# Patient Record
Sex: FEMALE | Race: BLACK OR AFRICAN AMERICAN | NOT HISPANIC OR LATINO | URBAN - METROPOLITAN AREA
[De-identification: names, ages, dates, MRNs, and addresses within clinical notes are randomized per-mention and may not be internally consistent; named-entity substitution may affect disease eponyms.]

---

## 2021-06-11 ENCOUNTER — INPATIENT (INPATIENT)
Facility: HOSPITAL | Age: 56
LOS: 5 days | Discharge: HOME | End: 2021-06-17
Attending: STUDENT IN AN ORGANIZED HEALTH CARE EDUCATION/TRAINING PROGRAM | Admitting: STUDENT IN AN ORGANIZED HEALTH CARE EDUCATION/TRAINING PROGRAM
Payer: COMMERCIAL

## 2021-06-11 VITALS
DIASTOLIC BLOOD PRESSURE: 91 MMHG | OXYGEN SATURATION: 100 % | HEART RATE: 92 BPM | TEMPERATURE: 98 F | SYSTOLIC BLOOD PRESSURE: 176 MMHG | RESPIRATION RATE: 18 BRPM

## 2021-06-11 DIAGNOSIS — Z98.890 OTHER SPECIFIED POSTPROCEDURAL STATES: Chronic | ICD-10-CM

## 2021-06-11 LAB
ALBUMIN SERPL ELPH-MCNC: 3.7 G/DL — SIGNIFICANT CHANGE UP (ref 3.3–5)
ALP SERPL-CCNC: 119 U/L — SIGNIFICANT CHANGE UP (ref 40–120)
ALT FLD-CCNC: 10 U/L — SIGNIFICANT CHANGE UP (ref 4–33)
ANION GAP SERPL CALC-SCNC: 13 MMOL/L — SIGNIFICANT CHANGE UP (ref 7–14)
AST SERPL-CCNC: 13 U/L — SIGNIFICANT CHANGE UP (ref 4–32)
BASOPHILS # BLD AUTO: 0.04 K/UL — SIGNIFICANT CHANGE UP (ref 0–0.2)
BASOPHILS NFR BLD AUTO: 0.5 % — SIGNIFICANT CHANGE UP (ref 0–2)
BILIRUB SERPL-MCNC: <0.2 MG/DL — SIGNIFICANT CHANGE UP (ref 0.2–1.2)
BUN SERPL-MCNC: 12 MG/DL — SIGNIFICANT CHANGE UP (ref 7–23)
CALCIUM SERPL-MCNC: 9.5 MG/DL — SIGNIFICANT CHANGE UP (ref 8.4–10.5)
CHLORIDE SERPL-SCNC: 102 MMOL/L — SIGNIFICANT CHANGE UP (ref 98–107)
CO2 SERPL-SCNC: 22 MMOL/L — SIGNIFICANT CHANGE UP (ref 22–31)
CREAT SERPL-MCNC: 1.17 MG/DL — SIGNIFICANT CHANGE UP (ref 0.5–1.3)
EOSINOPHIL # BLD AUTO: 0.13 K/UL — SIGNIFICANT CHANGE UP (ref 0–0.5)
EOSINOPHIL NFR BLD AUTO: 1.6 % — SIGNIFICANT CHANGE UP (ref 0–6)
GLUCOSE SERPL-MCNC: 220 MG/DL — HIGH (ref 70–99)
HCT VFR BLD CALC: 35.6 % — SIGNIFICANT CHANGE UP (ref 34.5–45)
HGB BLD-MCNC: 10.8 G/DL — LOW (ref 11.5–15.5)
IANC: 4.93 K/UL — SIGNIFICANT CHANGE UP (ref 1.5–8.5)
IMM GRANULOCYTES NFR BLD AUTO: 0.8 % — SIGNIFICANT CHANGE UP (ref 0–1.5)
LYMPHOCYTES # BLD AUTO: 2.37 K/UL — SIGNIFICANT CHANGE UP (ref 1–3.3)
LYMPHOCYTES # BLD AUTO: 29.7 % — SIGNIFICANT CHANGE UP (ref 13–44)
MCHC RBC-ENTMCNC: 24.8 PG — LOW (ref 27–34)
MCHC RBC-ENTMCNC: 30.3 GM/DL — LOW (ref 32–36)
MCV RBC AUTO: 81.8 FL — SIGNIFICANT CHANGE UP (ref 80–100)
MONOCYTES # BLD AUTO: 0.45 K/UL — SIGNIFICANT CHANGE UP (ref 0–0.9)
MONOCYTES NFR BLD AUTO: 5.6 % — SIGNIFICANT CHANGE UP (ref 2–14)
NEUTROPHILS # BLD AUTO: 4.93 K/UL — SIGNIFICANT CHANGE UP (ref 1.8–7.4)
NEUTROPHILS NFR BLD AUTO: 61.8 % — SIGNIFICANT CHANGE UP (ref 43–77)
NRBC # BLD: 0 /100 WBCS — SIGNIFICANT CHANGE UP
NRBC # FLD: 0 K/UL — SIGNIFICANT CHANGE UP
NT-PROBNP SERPL-SCNC: 166 PG/ML — SIGNIFICANT CHANGE UP
PLATELET # BLD AUTO: 204 K/UL — SIGNIFICANT CHANGE UP (ref 150–400)
POTASSIUM SERPL-MCNC: 4 MMOL/L — SIGNIFICANT CHANGE UP (ref 3.5–5.3)
POTASSIUM SERPL-SCNC: 4 MMOL/L — SIGNIFICANT CHANGE UP (ref 3.5–5.3)
PROT SERPL-MCNC: 7.5 G/DL — SIGNIFICANT CHANGE UP (ref 6–8.3)
RBC # BLD: 4.35 M/UL — SIGNIFICANT CHANGE UP (ref 3.8–5.2)
RBC # FLD: 16.3 % — HIGH (ref 10.3–14.5)
SODIUM SERPL-SCNC: 137 MMOL/L — SIGNIFICANT CHANGE UP (ref 135–145)
TROPONIN T, HIGH SENSITIVITY RESULT: 7 NG/L — SIGNIFICANT CHANGE UP
WBC # BLD: 7.98 K/UL — SIGNIFICANT CHANGE UP (ref 3.8–10.5)
WBC # FLD AUTO: 7.98 K/UL — SIGNIFICANT CHANGE UP (ref 3.8–10.5)

## 2021-06-11 PROCEDURE — 99285 EMERGENCY DEPT VISIT HI MDM: CPT | Mod: 25

## 2021-06-11 PROCEDURE — 71045 X-RAY EXAM CHEST 1 VIEW: CPT | Mod: 26

## 2021-06-11 PROCEDURE — 93970 EXTREMITY STUDY: CPT | Mod: 26

## 2021-06-11 PROCEDURE — 76770 US EXAM ABDO BACK WALL COMP: CPT | Mod: 26

## 2021-06-11 PROCEDURE — 93010 ELECTROCARDIOGRAM REPORT: CPT | Mod: NC

## 2021-06-11 RX ORDER — DIPHENHYDRAMINE HCL 50 MG
25 CAPSULE ORAL EVERY 4 HOURS
Refills: 0 | Status: DISCONTINUED | OUTPATIENT
Start: 2021-06-11 | End: 2021-06-17

## 2021-06-11 RX ORDER — ASPIRIN/CALCIUM CARB/MAGNESIUM 324 MG
162 TABLET ORAL ONCE
Refills: 0 | Status: COMPLETED | OUTPATIENT
Start: 2021-06-11 | End: 2021-06-11

## 2021-06-11 RX ADMIN — Medication 25 MILLIGRAM(S): at 21:39

## 2021-06-11 RX ADMIN — Medication 30 MILLILITER(S): at 21:39

## 2021-06-11 RX ADMIN — Medication 162 MILLIGRAM(S): at 21:39

## 2021-06-11 NOTE — ED ADULT TRIAGE NOTE - CHIEF COMPLAINT QUOTE
Complaining of bilateral LE edema x 3 weeks. Denies shortness of breath. Complaining of diffuse chest pain, neck pain and bilateral shoulder pain. History of type 2 diabetes, htn.

## 2021-06-11 NOTE — ED ADULT NURSE NOTE - OBJECTIVE STATEMENT
Floarsenio RN-patient received to room 2 A&Ox3 ambulatory with walker. C/O worsening B/L LLE increase in Urinary frequency and decrease in output X1 month. also C/O increase in pain in her Chronic back pain and shoulder pain. NSR on cardiac monitor. PMh DM and HTN. denies SOB nausea vomiting hematuria dysuria. 20G IV placed in RAC. labs sent. Pt endorsed to primary RN Cem. will continue to monitor.

## 2021-06-11 NOTE — ED PROVIDER NOTE - OBJECTIVE STATEMENT
57 y/o F with PMH HTN, HLD, DM on insulin, chronic back pain, fibromyalgia p/w B/L LE swelling and pain along with chest pain. Pt accompanied by her sister states she has been living in [place]. Pt states she started having worsening swelling in her b/l LE . She states that the swelling was accompanied by decreased urination as well. She states she has also had pain in her shoulder and chest which she has been taking pain medications for in [place], percocet. She is currently on duloxetine, gabapentin , carvediliol, losartan, amlodipine. She does not have a doctor here. She also complaining of itching in her b/l legs

## 2021-06-11 NOTE — ED PROVIDER NOTE - NS ED MD DISPO SPECIAL CONSIDERATION1
1.  No surgery indicated.  2.  Recommend see neurology and possible second opinion arm/hand specialist.       Fall Precaution/Geriatrics/Frailty/Low Suspicion of COVID-19

## 2021-06-11 NOTE — ED PROVIDER NOTE - PHYSICAL EXAMINATION
Gen: Awake, Alert, WD, WN, NAD  Head:  NC/AT  Eyes:  PERRL, EOMI, Conjunctiva pink, lids normal, no scleral icterus  ENT: OP clear, no exudates, , moist mucus membranes  Neck: supple, nontender, no meningismus, no JVD, trachea midline  Cardiac/CV:  S1 S2, RRR, no M/G/R  Respiratory/Pulm:  CTAB, good air movement, normal resp effort, no wheezes/stridor/retractions/rales/rhonchi  Gastrointestinal/Abdomen:  Soft, nontender, nondistended, +BS, no rebound/guarding  Back:  no CVAT, no MLT  Ext:  warm, well perfused, moving all extremities spontaneously, 4+  peripheral edema, distal pulses intact  Skin: intact, no rash  Neuro:  AAOx3, sensation intact, motor 5/5 x 4 extremities, speech clear

## 2021-06-11 NOTE — ED PROVIDER NOTE - NS ED ROS FT
denies fever, chills, +chest pain, SOB, abdominal pain, diarrhea, +decreased urination  syncope, bleeding, new rash,weakness, numbness, blurred vision  + LE swelling   ROS  otherwise negative as per HPI

## 2021-06-11 NOTE — ED PROVIDER NOTE - CLINICAL SUMMARY MEDICAL DECISION MAKING FREE TEXT BOX
55 y/o F with PMH HTN, HLD, DM on insulin, chronic back pain, fibromyalgia p/w B/L LE swelling and pain along with chest pain. Pt accompanied by her sister states she has been living in [place]. Pt states she started having worsening swelling in her b/l LE . She states that the swelling was accompanied by decreased urination as well. She states she has also had pain in her shoulder and chest which she has been taking pain medications for in [place],   pt w/ LE edema concerning for CHF vs renal failure, pt w/ decreased urinary output, will check ua, cbc, cmp, us kidney bladder, eval for dvt duplex b/l le, trop, heart score of 5. aspirin, will likely need lasix admission for diuresis and evaluation for possible CHF vs RD

## 2021-06-11 NOTE — ED PROVIDER NOTE - ATTENDING CONTRIBUTION TO CARE
attending wrote note  Dr. Rivas: I have personally seen and examined this patient at the bedside. I have fully participated in the care of this patient. I have reviewed all pertinent clinical information, including history, physical exam, plan and the Resident's note and agree except as noted. HPI above as by me. PE above as by me. DDX PLAN

## 2021-06-12 DIAGNOSIS — Z98.890 OTHER SPECIFIED POSTPROCEDURAL STATES: Chronic | ICD-10-CM

## 2021-06-12 DIAGNOSIS — E11.42 TYPE 2 DIABETES MELLITUS WITH DIABETIC POLYNEUROPATHY: ICD-10-CM

## 2021-06-12 DIAGNOSIS — E78.5 HYPERLIPIDEMIA, UNSPECIFIED: ICD-10-CM

## 2021-06-12 DIAGNOSIS — M79.7 FIBROMYALGIA: ICD-10-CM

## 2021-06-12 DIAGNOSIS — Z29.9 ENCOUNTER FOR PROPHYLACTIC MEASURES, UNSPECIFIED: ICD-10-CM

## 2021-06-12 DIAGNOSIS — I10 ESSENTIAL (PRIMARY) HYPERTENSION: ICD-10-CM

## 2021-06-12 DIAGNOSIS — R60.9 EDEMA, UNSPECIFIED: ICD-10-CM

## 2021-06-12 DIAGNOSIS — R07.9 CHEST PAIN, UNSPECIFIED: ICD-10-CM

## 2021-06-12 LAB
ANION GAP SERPL CALC-SCNC: 13 MMOL/L — SIGNIFICANT CHANGE UP (ref 7–14)
APPEARANCE UR: CLEAR — SIGNIFICANT CHANGE UP
BACTERIA # UR AUTO: NEGATIVE — SIGNIFICANT CHANGE UP
BASOPHILS # BLD AUTO: 0.03 K/UL — SIGNIFICANT CHANGE UP (ref 0–0.2)
BASOPHILS NFR BLD AUTO: 0.5 % — SIGNIFICANT CHANGE UP (ref 0–2)
BILIRUB UR-MCNC: NEGATIVE — SIGNIFICANT CHANGE UP
BUN SERPL-MCNC: 9 MG/DL — SIGNIFICANT CHANGE UP (ref 7–23)
CALCIUM SERPL-MCNC: 9.1 MG/DL — SIGNIFICANT CHANGE UP (ref 8.4–10.5)
CHLORIDE SERPL-SCNC: 103 MMOL/L — SIGNIFICANT CHANGE UP (ref 98–107)
CO2 SERPL-SCNC: 21 MMOL/L — LOW (ref 22–31)
COLOR SPEC: SIGNIFICANT CHANGE UP
CREAT ?TM UR-MCNC: 51 MG/DL — SIGNIFICANT CHANGE UP
CREAT SERPL-MCNC: 1.06 MG/DL — SIGNIFICANT CHANGE UP (ref 0.5–1.3)
DIFF PNL FLD: NEGATIVE — SIGNIFICANT CHANGE UP
EOSINOPHIL # BLD AUTO: 0.12 K/UL — SIGNIFICANT CHANGE UP (ref 0–0.5)
EOSINOPHIL NFR BLD AUTO: 1.9 % — SIGNIFICANT CHANGE UP (ref 0–6)
EPI CELLS # UR: 1 /HPF — SIGNIFICANT CHANGE UP (ref 0–5)
GLUCOSE BLDC GLUCOMTR-MCNC: 242 MG/DL — HIGH (ref 70–99)
GLUCOSE BLDC GLUCOMTR-MCNC: 273 MG/DL — HIGH (ref 70–99)
GLUCOSE BLDC GLUCOMTR-MCNC: 292 MG/DL — HIGH (ref 70–99)
GLUCOSE BLDC GLUCOMTR-MCNC: 321 MG/DL — HIGH (ref 70–99)
GLUCOSE SERPL-MCNC: 230 MG/DL — HIGH (ref 70–99)
GLUCOSE UR QL: ABNORMAL
HCT VFR BLD CALC: 30.3 % — LOW (ref 34.5–45)
HGB BLD-MCNC: 9.6 G/DL — LOW (ref 11.5–15.5)
IANC: 4.04 K/UL — SIGNIFICANT CHANGE UP (ref 1.5–8.5)
IMM GRANULOCYTES NFR BLD AUTO: 0.3 % — SIGNIFICANT CHANGE UP (ref 0–1.5)
KETONES UR-MCNC: NEGATIVE — SIGNIFICANT CHANGE UP
LEUKOCYTE ESTERASE UR-ACNC: NEGATIVE — SIGNIFICANT CHANGE UP
LYMPHOCYTES # BLD AUTO: 1.74 K/UL — SIGNIFICANT CHANGE UP (ref 1–3.3)
LYMPHOCYTES # BLD AUTO: 27.2 % — SIGNIFICANT CHANGE UP (ref 13–44)
MAGNESIUM SERPL-MCNC: 1.7 MG/DL — SIGNIFICANT CHANGE UP (ref 1.6–2.6)
MCHC RBC-ENTMCNC: 25.3 PG — LOW (ref 27–34)
MCHC RBC-ENTMCNC: 31.7 GM/DL — LOW (ref 32–36)
MCV RBC AUTO: 79.7 FL — LOW (ref 80–100)
MONOCYTES # BLD AUTO: 0.44 K/UL — SIGNIFICANT CHANGE UP (ref 0–0.9)
MONOCYTES NFR BLD AUTO: 6.9 % — SIGNIFICANT CHANGE UP (ref 2–14)
NEUTROPHILS # BLD AUTO: 4.04 K/UL — SIGNIFICANT CHANGE UP (ref 1.8–7.4)
NEUTROPHILS NFR BLD AUTO: 63.2 % — SIGNIFICANT CHANGE UP (ref 43–77)
NITRITE UR-MCNC: NEGATIVE — SIGNIFICANT CHANGE UP
NRBC # BLD: 0 /100 WBCS — SIGNIFICANT CHANGE UP
NRBC # FLD: 0 K/UL — SIGNIFICANT CHANGE UP
PH UR: 7.5 — SIGNIFICANT CHANGE UP (ref 5–8)
PHOSPHATE SERPL-MCNC: 3.3 MG/DL — SIGNIFICANT CHANGE UP (ref 2.5–4.5)
PLATELET # BLD AUTO: 180 K/UL — SIGNIFICANT CHANGE UP (ref 150–400)
POTASSIUM SERPL-MCNC: 3.8 MMOL/L — SIGNIFICANT CHANGE UP (ref 3.5–5.3)
POTASSIUM SERPL-SCNC: 3.8 MMOL/L — SIGNIFICANT CHANGE UP (ref 3.5–5.3)
PROT UR-MCNC: ABNORMAL
RBC # BLD: 3.8 M/UL — SIGNIFICANT CHANGE UP (ref 3.8–5.2)
RBC # FLD: 16.3 % — HIGH (ref 10.3–14.5)
RBC CASTS # UR COMP ASSIST: 1 /HPF — SIGNIFICANT CHANGE UP (ref 0–4)
SARS-COV-2 RNA SPEC QL NAA+PROBE: SIGNIFICANT CHANGE UP
SODIUM SERPL-SCNC: 137 MMOL/L — SIGNIFICANT CHANGE UP (ref 135–145)
SP GR SPEC: 1.01 — SIGNIFICANT CHANGE UP (ref 1.01–1.02)
TROPONIN T, HIGH SENSITIVITY RESULT: 6 NG/L — SIGNIFICANT CHANGE UP
UROBILINOGEN FLD QL: SIGNIFICANT CHANGE UP
WBC # BLD: 6.39 K/UL — SIGNIFICANT CHANGE UP (ref 3.8–10.5)
WBC # FLD AUTO: 6.39 K/UL — SIGNIFICANT CHANGE UP (ref 3.8–10.5)
WBC UR QL: 2 /HPF — SIGNIFICANT CHANGE UP (ref 0–5)

## 2021-06-12 PROCEDURE — 12345: CPT | Mod: NC

## 2021-06-12 PROCEDURE — 99223 1ST HOSP IP/OBS HIGH 75: CPT

## 2021-06-12 RX ORDER — GABAPENTIN 400 MG/1
300 CAPSULE ORAL THREE TIMES A DAY
Refills: 0 | Status: DISCONTINUED | OUTPATIENT
Start: 2021-06-12 | End: 2021-06-13

## 2021-06-12 RX ORDER — HUMAN INSULIN 100 [IU]/ML
8 INJECTION, SUSPENSION SUBCUTANEOUS ONCE
Refills: 0 | Status: COMPLETED | OUTPATIENT
Start: 2021-06-13 | End: 2021-06-13

## 2021-06-12 RX ORDER — DEXTROSE 50 % IN WATER 50 %
12.5 SYRINGE (ML) INTRAVENOUS ONCE
Refills: 0 | Status: DISCONTINUED | OUTPATIENT
Start: 2021-06-12 | End: 2021-06-17

## 2021-06-12 RX ORDER — FUROSEMIDE 40 MG
40 TABLET ORAL
Refills: 0 | Status: DISCONTINUED | OUTPATIENT
Start: 2021-06-12 | End: 2021-06-14

## 2021-06-12 RX ORDER — SUCRALFATE 1 G
1 TABLET ORAL
Refills: 0 | Status: DISCONTINUED | OUTPATIENT
Start: 2021-06-12 | End: 2021-06-17

## 2021-06-12 RX ORDER — HYDRALAZINE HCL 50 MG
2.5 TABLET ORAL ONCE
Refills: 0 | Status: COMPLETED | OUTPATIENT
Start: 2021-06-12 | End: 2021-06-12

## 2021-06-12 RX ORDER — INSULIN LISPRO 100/ML
VIAL (ML) SUBCUTANEOUS AT BEDTIME
Refills: 0 | Status: DISCONTINUED | OUTPATIENT
Start: 2021-06-12 | End: 2021-06-17

## 2021-06-12 RX ORDER — CARVEDILOL PHOSPHATE 80 MG/1
25 CAPSULE, EXTENDED RELEASE ORAL EVERY 12 HOURS
Refills: 0 | Status: DISCONTINUED | OUTPATIENT
Start: 2021-06-12 | End: 2021-06-17

## 2021-06-12 RX ORDER — ACETAMINOPHEN 500 MG
650 TABLET ORAL ONCE
Refills: 0 | Status: COMPLETED | OUTPATIENT
Start: 2021-06-12 | End: 2021-06-12

## 2021-06-12 RX ORDER — SODIUM CHLORIDE 9 MG/ML
1000 INJECTION, SOLUTION INTRAVENOUS
Refills: 0 | Status: DISCONTINUED | OUTPATIENT
Start: 2021-06-12 | End: 2021-06-17

## 2021-06-12 RX ORDER — DEXTROSE 50 % IN WATER 50 %
15 SYRINGE (ML) INTRAVENOUS ONCE
Refills: 0 | Status: DISCONTINUED | OUTPATIENT
Start: 2021-06-12 | End: 2021-06-17

## 2021-06-12 RX ORDER — DULOXETINE HYDROCHLORIDE 30 MG/1
30 CAPSULE, DELAYED RELEASE ORAL DAILY
Refills: 0 | Status: DISCONTINUED | OUTPATIENT
Start: 2021-06-12 | End: 2021-06-14

## 2021-06-12 RX ORDER — GLUCAGON INJECTION, SOLUTION 0.5 MG/.1ML
1 INJECTION, SOLUTION SUBCUTANEOUS ONCE
Refills: 0 | Status: DISCONTINUED | OUTPATIENT
Start: 2021-06-12 | End: 2021-06-17

## 2021-06-12 RX ORDER — AMLODIPINE BESYLATE 2.5 MG/1
10 TABLET ORAL DAILY
Refills: 0 | Status: DISCONTINUED | OUTPATIENT
Start: 2021-06-12 | End: 2021-06-12

## 2021-06-12 RX ORDER — DEXTROSE 50 % IN WATER 50 %
25 SYRINGE (ML) INTRAVENOUS ONCE
Refills: 0 | Status: DISCONTINUED | OUTPATIENT
Start: 2021-06-12 | End: 2021-06-17

## 2021-06-12 RX ORDER — LOSARTAN POTASSIUM 100 MG/1
100 TABLET, FILM COATED ORAL DAILY
Refills: 0 | Status: DISCONTINUED | OUTPATIENT
Start: 2021-06-12 | End: 2021-06-17

## 2021-06-12 RX ORDER — INSULIN LISPRO 100/ML
VIAL (ML) SUBCUTANEOUS
Refills: 0 | Status: DISCONTINUED | OUTPATIENT
Start: 2021-06-12 | End: 2021-06-17

## 2021-06-12 RX ORDER — BACLOFEN 100 %
10 POWDER (GRAM) MISCELLANEOUS THREE TIMES A DAY
Refills: 0 | Status: DISCONTINUED | OUTPATIENT
Start: 2021-06-12 | End: 2021-06-17

## 2021-06-12 RX ORDER — PANTOPRAZOLE SODIUM 20 MG/1
40 TABLET, DELAYED RELEASE ORAL
Refills: 0 | Status: DISCONTINUED | OUTPATIENT
Start: 2021-06-12 | End: 2021-06-17

## 2021-06-12 RX ORDER — NYSTATIN 500MM UNIT
500000 POWDER (EA) MISCELLANEOUS
Refills: 0 | Status: DISCONTINUED | OUTPATIENT
Start: 2021-06-12 | End: 2021-06-17

## 2021-06-12 RX ORDER — INSULIN GLARGINE 100 [IU]/ML
15 INJECTION, SOLUTION SUBCUTANEOUS EVERY MORNING
Refills: 0 | Status: DISCONTINUED | OUTPATIENT
Start: 2021-06-12 | End: 2021-06-12

## 2021-06-12 RX ADMIN — GABAPENTIN 300 MILLIGRAM(S): 400 CAPSULE ORAL at 13:29

## 2021-06-12 RX ADMIN — Medication 40 MILLIGRAM(S): at 17:59

## 2021-06-12 RX ADMIN — DULOXETINE HYDROCHLORIDE 30 MILLIGRAM(S): 30 CAPSULE, DELAYED RELEASE ORAL at 13:28

## 2021-06-12 RX ADMIN — Medication 500000 UNIT(S): at 22:12

## 2021-06-12 RX ADMIN — Medication 4: at 09:39

## 2021-06-12 RX ADMIN — PANTOPRAZOLE SODIUM 40 MILLIGRAM(S): 20 TABLET, DELAYED RELEASE ORAL at 06:53

## 2021-06-12 RX ADMIN — Medication 1 GRAM(S): at 13:29

## 2021-06-12 RX ADMIN — Medication 10 MILLIGRAM(S): at 22:10

## 2021-06-12 RX ADMIN — Medication 2.5 MILLIGRAM(S): at 02:54

## 2021-06-12 RX ADMIN — Medication 1: at 23:12

## 2021-06-12 RX ADMIN — Medication 10 MILLIGRAM(S): at 13:29

## 2021-06-12 RX ADMIN — CARVEDILOL PHOSPHATE 25 MILLIGRAM(S): 80 CAPSULE, EXTENDED RELEASE ORAL at 17:59

## 2021-06-12 RX ADMIN — Medication 1 GRAM(S): at 22:10

## 2021-06-12 RX ADMIN — Medication 650 MILLIGRAM(S): at 22:35

## 2021-06-12 RX ADMIN — INSULIN GLARGINE 15 UNIT(S): 100 INJECTION, SOLUTION SUBCUTANEOUS at 10:30

## 2021-06-12 RX ADMIN — Medication 6: at 17:58

## 2021-06-12 RX ADMIN — AMLODIPINE BESYLATE 10 MILLIGRAM(S): 2.5 TABLET ORAL at 06:53

## 2021-06-12 RX ADMIN — Medication 8: at 13:28

## 2021-06-12 RX ADMIN — Medication 500000 UNIT(S): at 18:34

## 2021-06-12 RX ADMIN — Medication 1 GRAM(S): at 17:59

## 2021-06-12 RX ADMIN — Medication 25 MILLIGRAM(S): at 20:55

## 2021-06-12 RX ADMIN — Medication 1 TABLET(S): at 13:28

## 2021-06-12 RX ADMIN — GABAPENTIN 300 MILLIGRAM(S): 400 CAPSULE ORAL at 22:10

## 2021-06-12 RX ADMIN — LOSARTAN POTASSIUM 100 MILLIGRAM(S): 100 TABLET, FILM COATED ORAL at 06:53

## 2021-06-12 RX ADMIN — Medication 650 MILLIGRAM(S): at 23:20

## 2021-06-12 RX ADMIN — Medication 500000 UNIT(S): at 13:27

## 2021-06-12 NOTE — H&P ADULT - ASSESSMENT
56 y.o. woman with HTN, HLD, DM II on insulin, diabetic neuropathy, fibromyalgia, and chronic back pain now with b/l LE edema.

## 2021-06-12 NOTE — H&P ADULT - HISTORY OF PRESENT ILLNESS
56 y.o. woman with history of HTN, HLD, DM II on insulin with peripheral neuropathy, and fibromyalgia who came to the ER for evaluation of b/l LE edema and paresthesia of 6 weeks in duration. Patient recently moved from [place]. She states that her symptoms were getting progressively, therefore, she came to the ER for further evaluation. No reports of chest pain, palpitations, or SOB. She reports intermittent compliance with her prescribed dose of metformin and basaglar. She also reports ongoing chronic lower back pain.

## 2021-06-12 NOTE — PROGRESS NOTE ADULT - PROBLEM SELECTOR PLAN 5
- Carbohydrate restricted diet  - Start on basaglar 15 units Daily as patient was not routinely taking metformin or the 20 units dose of basaglar at home  - will transition to 15units qHS tomorrow. and give NPH 8U tomorrow AM.   - HBA1c in AM  - Continue with gabapentin for treatment of the neuropathy  - her LE symptoms likely neuropathic symptoms. can increase gabapentin for symptom control.

## 2021-06-12 NOTE — H&P ADULT - PROBLEM SELECTOR PLAN 5
- Carbohydrate restricted diet  - Start on basaglar 15 units Daily as patient was not routinely taking metformin or the 20 units dose of basaglar at home  - HBA1c in AM  - Continue with gabapentin for treatment of the neuropathy

## 2021-06-12 NOTE — PATIENT PROFILE ADULT - NSPROGENSOURCEINFO_GEN_A_NUR
BLADDER INFECTION, Female (Adult)    A bladder infection (\"cystitis\" or \"UTI\") usually causes a constant urge to urinate and a burning when passing urine. Urine may be cloudy, smelly or dark. There may be pain in the lower abdomen. A bladder infection occurs when bacteria from the vaginal area enter the bladder opening (urethra). This can occur from sexual intercourse, wearing tight clothing, dehydration and other factors.  HOME CARE:  · Drink lots of fluids (at least 6-8 glasses a day, unless you must restrict fluids for other medical reasons). This will force the medicine into your urinary system and flush the bacteria out of your body.  · Avoid sexual intercourse until your symptoms are gone.  · Avoid caffeine, alcohol and spicy foods. These can irritate the bladder.  · A bladder infection is treated with antibiotics. You may also be given Pyridium (generic = phenazopyridine) to reduce the burning sensation. This medicine will cause your urine to become a bright orange color. The orange urine may stain clothing. You may wear a pad or panty-liner to protect clothing.  PREVENTING FUTURE INFECTIONS:  · Always wipe from front to back after a bowel movement.  · Keep the genital area clean and dry.  · Drink plenty of fluids each day to avoid dehydration.  · Both sexual partners should wash before intercourse.  · Urinate right after intercourse to flush out the bladder.  · Wear cotton underwear and cotton-lined panty hose; avoid tight-fitting pants.  · If you are on birth control pills and are having frequent bladder infections, discuss with your doctor.  FOLLOW UP: Return to this facility or see your doctor if ALL symptoms are not gone after three days of treatment.  GET PROMPT MEDICAL ATTENTION if any of the following occur:  · Fever of 100.4ºF (38ºC) or higher, or as directed by your healthcare provider  · No improvement by the third day of treatment  · Increasing back or abdominal pain  · Repeated vomiting; unable  to keep medicine down  · Weakness, dizziness or fainting  · Vaginal discharge  · Pain, redness or swelling in the labia (outer vaginal area)  © 4237-4551 [employer], [address] [place] [address], [place] [address]. All rights reserved. This information is not intended as a substitute for professional medical care. Always follow your healthcare professional's instructions.     patient

## 2021-06-12 NOTE — H&P ADULT - NSICDXPASTMEDICALHX_GEN_ALL_CORE_FT
PAST MEDICAL HISTORY:  DM (diabetes mellitus)     Fibromyalgia     HLD (hyperlipidemia)     HTN (hypertension)

## 2021-06-12 NOTE — H&P ADULT - NSHPPHYSICALEXAM_GEN_ALL_CORE
Vital Signs Last 24 Hrs  T(C): 36.8 (12 Jun 2021 02:42), Max: 37.2 (12 Jun 2021 01:34)  T(F): 98.2 (12 Jun 2021 02:42), Max: 98.9 (12 Jun 2021 01:34)  HR: 90 (12 Jun 2021 03:52) (70 - 92)  BP: 159/96 (12 Jun 2021 03:52) (159/96 - 189/97)  BP(mean): --  RR: 18 (12 Jun 2021 02:42) (16 - 18)  SpO2: 100% (12 Jun 2021 02:42) (100% - 100%)

## 2021-06-13 LAB
A1C WITH ESTIMATED AVERAGE GLUCOSE RESULT: 8.4 % — HIGH (ref 4–5.6)
ANION GAP SERPL CALC-SCNC: 14 MMOL/L — SIGNIFICANT CHANGE UP (ref 7–14)
BUN SERPL-MCNC: 6 MG/DL — LOW (ref 7–23)
CALCIUM SERPL-MCNC: 9.6 MG/DL — SIGNIFICANT CHANGE UP (ref 8.4–10.5)
CHLORIDE SERPL-SCNC: 103 MMOL/L — SIGNIFICANT CHANGE UP (ref 98–107)
CO2 SERPL-SCNC: 24 MMOL/L — SIGNIFICANT CHANGE UP (ref 22–31)
COVID-19 SPIKE DOMAIN AB INTERP: POSITIVE
COVID-19 SPIKE DOMAIN ANTIBODY RESULT: >250 U/ML — HIGH
CREAT SERPL-MCNC: 1.07 MG/DL — SIGNIFICANT CHANGE UP (ref 0.5–1.3)
ESTIMATED AVERAGE GLUCOSE: 194 MG/DL — HIGH (ref 68–114)
GLUCOSE BLDC GLUCOMTR-MCNC: 258 MG/DL — HIGH (ref 70–99)
GLUCOSE BLDC GLUCOMTR-MCNC: 276 MG/DL — HIGH (ref 70–99)
GLUCOSE BLDC GLUCOMTR-MCNC: 291 MG/DL — HIGH (ref 70–99)
GLUCOSE BLDC GLUCOMTR-MCNC: 295 MG/DL — HIGH (ref 70–99)
GLUCOSE SERPL-MCNC: 217 MG/DL — HIGH (ref 70–99)
HCT VFR BLD CALC: 33.7 % — LOW (ref 34.5–45)
HCV AB S/CO SERPL IA: 0.15 S/CO — SIGNIFICANT CHANGE UP (ref 0–0.99)
HCV AB SERPL-IMP: SIGNIFICANT CHANGE UP
HGB BLD-MCNC: 10.8 G/DL — LOW (ref 11.5–15.5)
MAGNESIUM SERPL-MCNC: 1.8 MG/DL — SIGNIFICANT CHANGE UP (ref 1.6–2.6)
MCHC RBC-ENTMCNC: 25.2 PG — LOW (ref 27–34)
MCHC RBC-ENTMCNC: 32 GM/DL — SIGNIFICANT CHANGE UP (ref 32–36)
MCV RBC AUTO: 78.6 FL — LOW (ref 80–100)
NRBC # BLD: 0 /100 WBCS — SIGNIFICANT CHANGE UP
NRBC # FLD: 0 K/UL — SIGNIFICANT CHANGE UP
PHOSPHATE SERPL-MCNC: 4.3 MG/DL — SIGNIFICANT CHANGE UP (ref 2.5–4.5)
PLATELET # BLD AUTO: 212 K/UL — SIGNIFICANT CHANGE UP (ref 150–400)
POTASSIUM SERPL-MCNC: 3.3 MMOL/L — LOW (ref 3.5–5.3)
POTASSIUM SERPL-SCNC: 3.3 MMOL/L — LOW (ref 3.5–5.3)
RBC # BLD: 4.29 M/UL — SIGNIFICANT CHANGE UP (ref 3.8–5.2)
RBC # FLD: 16.4 % — HIGH (ref 10.3–14.5)
SARS-COV-2 IGG+IGM SERPL QL IA: >250 U/ML — HIGH
SARS-COV-2 IGG+IGM SERPL QL IA: POSITIVE
SODIUM SERPL-SCNC: 141 MMOL/L — SIGNIFICANT CHANGE UP (ref 135–145)
WBC # BLD: 6.01 K/UL — SIGNIFICANT CHANGE UP (ref 3.8–10.5)
WBC # FLD AUTO: 6.01 K/UL — SIGNIFICANT CHANGE UP (ref 3.8–10.5)

## 2021-06-13 PROCEDURE — 99233 SBSQ HOSP IP/OBS HIGH 50: CPT

## 2021-06-13 RX ORDER — OXYCODONE AND ACETAMINOPHEN 5; 325 MG/1; MG/1
2 TABLET ORAL EVERY 6 HOURS
Refills: 0 | Status: DISCONTINUED | OUTPATIENT
Start: 2021-06-13 | End: 2021-06-17

## 2021-06-13 RX ORDER — OXYCODONE AND ACETAMINOPHEN 5; 325 MG/1; MG/1
1 TABLET ORAL EVERY 6 HOURS
Refills: 0 | Status: DISCONTINUED | OUTPATIENT
Start: 2021-06-13 | End: 2021-06-17

## 2021-06-13 RX ORDER — POTASSIUM CHLORIDE 20 MEQ
20 PACKET (EA) ORAL ONCE
Refills: 0 | Status: DISCONTINUED | OUTPATIENT
Start: 2021-06-13 | End: 2021-06-13

## 2021-06-13 RX ORDER — FENTANYL CITRATE 50 UG/ML
1 INJECTION INTRAVENOUS
Refills: 0 | Status: DISCONTINUED | OUTPATIENT
Start: 2021-06-13 | End: 2021-06-17

## 2021-06-13 RX ORDER — ACETAMINOPHEN 500 MG
650 TABLET ORAL EVERY 6 HOURS
Refills: 0 | Status: DISCONTINUED | OUTPATIENT
Start: 2021-06-13 | End: 2021-06-17

## 2021-06-13 RX ORDER — POTASSIUM CHLORIDE 20 MEQ
40 PACKET (EA) ORAL ONCE
Refills: 0 | Status: COMPLETED | OUTPATIENT
Start: 2021-06-13 | End: 2021-06-13

## 2021-06-13 RX ORDER — INSULIN GLARGINE 100 [IU]/ML
20 INJECTION, SOLUTION SUBCUTANEOUS AT BEDTIME
Refills: 0 | Status: DISCONTINUED | OUTPATIENT
Start: 2021-06-13 | End: 2021-06-14

## 2021-06-13 RX ORDER — GABAPENTIN 400 MG/1
400 CAPSULE ORAL THREE TIMES A DAY
Refills: 0 | Status: DISCONTINUED | OUTPATIENT
Start: 2021-06-13 | End: 2021-06-14

## 2021-06-13 RX ADMIN — Medication 6: at 12:32

## 2021-06-13 RX ADMIN — Medication 1 GRAM(S): at 05:26

## 2021-06-13 RX ADMIN — HUMAN INSULIN 8 UNIT(S): 100 INJECTION, SUSPENSION SUBCUTANEOUS at 12:32

## 2021-06-13 RX ADMIN — GABAPENTIN 300 MILLIGRAM(S): 400 CAPSULE ORAL at 12:33

## 2021-06-13 RX ADMIN — Medication 6: at 08:39

## 2021-06-13 RX ADMIN — Medication 1 GRAM(S): at 08:40

## 2021-06-13 RX ADMIN — GABAPENTIN 300 MILLIGRAM(S): 400 CAPSULE ORAL at 05:26

## 2021-06-13 RX ADMIN — CARVEDILOL PHOSPHATE 25 MILLIGRAM(S): 80 CAPSULE, EXTENDED RELEASE ORAL at 05:26

## 2021-06-13 RX ADMIN — Medication 500000 UNIT(S): at 08:40

## 2021-06-13 RX ADMIN — Medication 1 TABLET(S): at 08:40

## 2021-06-13 RX ADMIN — Medication 40 MILLIGRAM(S): at 17:41

## 2021-06-13 RX ADMIN — CARVEDILOL PHOSPHATE 25 MILLIGRAM(S): 80 CAPSULE, EXTENDED RELEASE ORAL at 17:41

## 2021-06-13 RX ADMIN — PANTOPRAZOLE SODIUM 40 MILLIGRAM(S): 20 TABLET, DELAYED RELEASE ORAL at 05:26

## 2021-06-13 RX ADMIN — Medication 650 MILLIGRAM(S): at 18:09

## 2021-06-13 RX ADMIN — GABAPENTIN 400 MILLIGRAM(S): 400 CAPSULE ORAL at 21:49

## 2021-06-13 RX ADMIN — Medication 10 MILLIGRAM(S): at 21:49

## 2021-06-13 RX ADMIN — FENTANYL CITRATE 1 PATCH: 50 INJECTION INTRAVENOUS at 21:29

## 2021-06-13 RX ADMIN — INSULIN GLARGINE 20 UNIT(S): 100 INJECTION, SOLUTION SUBCUTANEOUS at 23:19

## 2021-06-13 RX ADMIN — FENTANYL CITRATE 1 PATCH: 50 INJECTION INTRAVENOUS at 13:59

## 2021-06-13 RX ADMIN — Medication 6: at 17:41

## 2021-06-13 RX ADMIN — Medication 650 MILLIGRAM(S): at 19:09

## 2021-06-13 RX ADMIN — Medication 500000 UNIT(S): at 23:20

## 2021-06-13 RX ADMIN — Medication 40 MILLIEQUIVALENT(S): at 12:32

## 2021-06-13 RX ADMIN — DULOXETINE HYDROCHLORIDE 30 MILLIGRAM(S): 30 CAPSULE, DELAYED RELEASE ORAL at 08:40

## 2021-06-13 RX ADMIN — Medication 1 GRAM(S): at 17:42

## 2021-06-13 RX ADMIN — Medication 500000 UNIT(S): at 17:41

## 2021-06-13 RX ADMIN — Medication 40 MILLIGRAM(S): at 05:25

## 2021-06-13 RX ADMIN — LOSARTAN POTASSIUM 100 MILLIGRAM(S): 100 TABLET, FILM COATED ORAL at 05:26

## 2021-06-13 RX ADMIN — Medication 10 MILLIGRAM(S): at 12:33

## 2021-06-13 RX ADMIN — Medication 10 MILLIGRAM(S): at 05:26

## 2021-06-13 RX ADMIN — Medication 1 GRAM(S): at 23:20

## 2021-06-13 RX ADMIN — Medication 1: at 23:19

## 2021-06-13 RX ADMIN — Medication 500000 UNIT(S): at 05:25

## 2021-06-13 RX ADMIN — Medication 100 MILLIGRAM(S): at 05:24

## 2021-06-13 NOTE — PROGRESS NOTE ADULT - PROBLEM SELECTOR PLAN 5
- Carbohydrate restricted diet  - c/w lantus qhs, ISS pre-meal.   - Continue with gabapentin for treatment of the neuropathy  - her LE symptoms likely neuropathic symptoms. increase gabapentin for symptom control.

## 2021-06-14 LAB
ANION GAP SERPL CALC-SCNC: 13 MMOL/L — SIGNIFICANT CHANGE UP (ref 7–14)
BUN SERPL-MCNC: 12 MG/DL — SIGNIFICANT CHANGE UP (ref 7–23)
CALCIUM SERPL-MCNC: 9.4 MG/DL — SIGNIFICANT CHANGE UP (ref 8.4–10.5)
CHLORIDE SERPL-SCNC: 102 MMOL/L — SIGNIFICANT CHANGE UP (ref 98–107)
CO2 SERPL-SCNC: 23 MMOL/L — SIGNIFICANT CHANGE UP (ref 22–31)
CREAT SERPL-MCNC: 1.2 MG/DL — SIGNIFICANT CHANGE UP (ref 0.5–1.3)
GLUCOSE BLDC GLUCOMTR-MCNC: 251 MG/DL — HIGH (ref 70–99)
GLUCOSE BLDC GLUCOMTR-MCNC: 270 MG/DL — HIGH (ref 70–99)
GLUCOSE BLDC GLUCOMTR-MCNC: 299 MG/DL — HIGH (ref 70–99)
GLUCOSE BLDC GLUCOMTR-MCNC: 303 MG/DL — HIGH (ref 70–99)
GLUCOSE SERPL-MCNC: 238 MG/DL — HIGH (ref 70–99)
HCT VFR BLD CALC: 34.5 % — SIGNIFICANT CHANGE UP (ref 34.5–45)
HGB BLD-MCNC: 10.8 G/DL — LOW (ref 11.5–15.5)
MAGNESIUM SERPL-MCNC: 1.7 MG/DL — SIGNIFICANT CHANGE UP (ref 1.6–2.6)
MCHC RBC-ENTMCNC: 24.9 PG — LOW (ref 27–34)
MCHC RBC-ENTMCNC: 31.3 GM/DL — LOW (ref 32–36)
MCV RBC AUTO: 79.7 FL — LOW (ref 80–100)
NRBC # BLD: 0 /100 WBCS — SIGNIFICANT CHANGE UP
NRBC # FLD: 0 K/UL — SIGNIFICANT CHANGE UP
PHOSPHATE SERPL-MCNC: 3.7 MG/DL — SIGNIFICANT CHANGE UP (ref 2.5–4.5)
PLATELET # BLD AUTO: 251 K/UL — SIGNIFICANT CHANGE UP (ref 150–400)
POTASSIUM SERPL-MCNC: 3.5 MMOL/L — SIGNIFICANT CHANGE UP (ref 3.5–5.3)
POTASSIUM SERPL-SCNC: 3.5 MMOL/L — SIGNIFICANT CHANGE UP (ref 3.5–5.3)
RBC # BLD: 4.33 M/UL — SIGNIFICANT CHANGE UP (ref 3.8–5.2)
RBC # FLD: 16.2 % — HIGH (ref 10.3–14.5)
SODIUM SERPL-SCNC: 138 MMOL/L — SIGNIFICANT CHANGE UP (ref 135–145)
WBC # BLD: 7.34 K/UL — SIGNIFICANT CHANGE UP (ref 3.8–10.5)
WBC # FLD AUTO: 7.34 K/UL — SIGNIFICANT CHANGE UP (ref 3.8–10.5)

## 2021-06-14 PROCEDURE — 99233 SBSQ HOSP IP/OBS HIGH 50: CPT

## 2021-06-14 PROCEDURE — 93306 TTE W/DOPPLER COMPLETE: CPT | Mod: 26

## 2021-06-14 RX ORDER — ATORVASTATIN CALCIUM 80 MG/1
40 TABLET, FILM COATED ORAL AT BEDTIME
Refills: 0 | Status: DISCONTINUED | OUTPATIENT
Start: 2021-06-14 | End: 2021-06-17

## 2021-06-14 RX ORDER — LANOLIN ALCOHOL/MO/W.PET/CERES
3 CREAM (GRAM) TOPICAL AT BEDTIME
Refills: 0 | Status: DISCONTINUED | OUTPATIENT
Start: 2021-06-14 | End: 2021-06-17

## 2021-06-14 RX ORDER — HYDRALAZINE HCL 50 MG
10 TABLET ORAL EVERY 8 HOURS
Refills: 0 | Status: DISCONTINUED | OUTPATIENT
Start: 2021-06-14 | End: 2021-06-17

## 2021-06-14 RX ORDER — DULOXETINE HYDROCHLORIDE 30 MG/1
60 CAPSULE, DELAYED RELEASE ORAL DAILY
Refills: 0 | Status: DISCONTINUED | OUTPATIENT
Start: 2021-06-14 | End: 2021-06-17

## 2021-06-14 RX ORDER — HYDROCHLOROTHIAZIDE 25 MG
25 TABLET ORAL DAILY
Refills: 0 | Status: DISCONTINUED | OUTPATIENT
Start: 2021-06-14 | End: 2021-06-17

## 2021-06-14 RX ORDER — INSULIN LISPRO 100/ML
2 VIAL (ML) SUBCUTANEOUS
Refills: 0 | Status: DISCONTINUED | OUTPATIENT
Start: 2021-06-14 | End: 2021-06-15

## 2021-06-14 RX ORDER — FUROSEMIDE 40 MG
40 TABLET ORAL DAILY
Refills: 0 | Status: DISCONTINUED | OUTPATIENT
Start: 2021-06-14 | End: 2021-06-15

## 2021-06-14 RX ORDER — INSULIN GLARGINE 100 [IU]/ML
22 INJECTION, SOLUTION SUBCUTANEOUS AT BEDTIME
Refills: 0 | Status: DISCONTINUED | OUTPATIENT
Start: 2021-06-14 | End: 2021-06-15

## 2021-06-14 RX ORDER — GABAPENTIN 400 MG/1
600 CAPSULE ORAL THREE TIMES A DAY
Refills: 0 | Status: DISCONTINUED | OUTPATIENT
Start: 2021-06-14 | End: 2021-06-17

## 2021-06-14 RX ADMIN — Medication 1 GRAM(S): at 17:24

## 2021-06-14 RX ADMIN — GABAPENTIN 600 MILLIGRAM(S): 400 CAPSULE ORAL at 12:50

## 2021-06-14 RX ADMIN — Medication 500000 UNIT(S): at 09:08

## 2021-06-14 RX ADMIN — Medication 1 GRAM(S): at 05:36

## 2021-06-14 RX ADMIN — Medication 10 MILLIGRAM(S): at 21:11

## 2021-06-14 RX ADMIN — GABAPENTIN 600 MILLIGRAM(S): 400 CAPSULE ORAL at 21:11

## 2021-06-14 RX ADMIN — Medication 40 MILLIGRAM(S): at 17:25

## 2021-06-14 RX ADMIN — Medication 10 MILLIGRAM(S): at 12:50

## 2021-06-14 RX ADMIN — Medication 40 MILLIGRAM(S): at 05:35

## 2021-06-14 RX ADMIN — Medication 6: at 17:24

## 2021-06-14 RX ADMIN — Medication 10 MILLIGRAM(S): at 05:36

## 2021-06-14 RX ADMIN — Medication 3 MILLIGRAM(S): at 23:30

## 2021-06-14 RX ADMIN — ATORVASTATIN CALCIUM 40 MILLIGRAM(S): 80 TABLET, FILM COATED ORAL at 21:11

## 2021-06-14 RX ADMIN — FENTANYL CITRATE 1 PATCH: 50 INJECTION INTRAVENOUS at 21:05

## 2021-06-14 RX ADMIN — Medication 2 UNIT(S): at 17:24

## 2021-06-14 RX ADMIN — Medication 500000 UNIT(S): at 23:18

## 2021-06-14 RX ADMIN — FENTANYL CITRATE 1 PATCH: 50 INJECTION INTRAVENOUS at 07:02

## 2021-06-14 RX ADMIN — Medication 6: at 12:48

## 2021-06-14 RX ADMIN — Medication 10 MILLIGRAM(S): at 09:07

## 2021-06-14 RX ADMIN — Medication 1 GRAM(S): at 23:18

## 2021-06-14 RX ADMIN — Medication 1 GRAM(S): at 09:08

## 2021-06-14 RX ADMIN — PANTOPRAZOLE SODIUM 40 MILLIGRAM(S): 20 TABLET, DELAYED RELEASE ORAL at 05:36

## 2021-06-14 RX ADMIN — Medication 6: at 09:07

## 2021-06-14 RX ADMIN — GABAPENTIN 400 MILLIGRAM(S): 400 CAPSULE ORAL at 05:35

## 2021-06-14 RX ADMIN — DULOXETINE HYDROCHLORIDE 30 MILLIGRAM(S): 30 CAPSULE, DELAYED RELEASE ORAL at 09:08

## 2021-06-14 RX ADMIN — LOSARTAN POTASSIUM 100 MILLIGRAM(S): 100 TABLET, FILM COATED ORAL at 05:36

## 2021-06-14 RX ADMIN — INSULIN GLARGINE 22 UNIT(S): 100 INJECTION, SOLUTION SUBCUTANEOUS at 21:47

## 2021-06-14 RX ADMIN — Medication 1 TABLET(S): at 09:08

## 2021-06-14 RX ADMIN — CARVEDILOL PHOSPHATE 25 MILLIGRAM(S): 80 CAPSULE, EXTENDED RELEASE ORAL at 17:25

## 2021-06-14 RX ADMIN — Medication 2: at 21:46

## 2021-06-14 RX ADMIN — Medication 500000 UNIT(S): at 17:24

## 2021-06-14 RX ADMIN — Medication 10 MILLIGRAM(S): at 12:49

## 2021-06-14 RX ADMIN — CARVEDILOL PHOSPHATE 25 MILLIGRAM(S): 80 CAPSULE, EXTENDED RELEASE ORAL at 05:36

## 2021-06-14 RX ADMIN — Medication 500000 UNIT(S): at 05:35

## 2021-06-14 NOTE — PROGRESS NOTE ADULT - PROBLEM SELECTOR PLAN 5
- Carbohydrate restricted diet  - c/w lantus qhs, ISS pre-meal. will increase lantus to 22 units, and added standing pre-meal 2 units   - Continue with gabapentin for treatment of the neuropathy  - her LE symptoms likely neuropathic symptoms. increase gabapentin for symptom control. to 600mg daily

## 2021-06-14 NOTE — PHYSICAL THERAPY INITIAL EVALUATION ADULT - GENERAL OBSERVATIONS, REHAB EVAL
Patient received semi supine in bed , (+) tele monitor, (+) swelling of LE's , pt in no apparent distress.

## 2021-06-14 NOTE — PHYSICAL THERAPY INITIAL EVALUATION ADULT - FOLLOWS COMMANDS/ANSWERS QUESTIONS, REHAB EVAL
Chief Complaint   Patient presents with    Diabetes    Hyperlipidemia    Hypertension     Family History   Problem Relation Age of Onset    Diabetes Mother     Other Father         bee sting and syncope at 79     Social History     Socioeconomic History    Marital status:      Spouse name: Mary Mcallister Number of children: 2    Years of education: Not on file    Highest education level: Not on file   Occupational History    Occupation:  with [employer]   Social Needs    Financial resource strain: Not on file    Food insecurity     Worry: Not on file     Inability: Not on file   [employer] needs     Medical: Not on file     Non-medical: Not on file   Tobacco Use    Smoking status: Former Smoker     Packs/day: 1.50     Years: 40.00     Pack years: 60.00     Last attempt to quit: 2007     Years since quittin.6    Smokeless tobacco: Never Used   Substance and Sexual Activity    Alcohol use: Yes     Comment: drinks 4/day    Drug use: Not on file    Sexual activity: Not on file   Lifestyle    Physical activity     Days per week: Not on file     Minutes per session: Not on file    Stress: Not on file   Relationships    Social connections     Talks on phone: Not on file     Gets together: Not on file     Attends Druze service: Not on file     Active member of club or organization: Not on file     Attends meetings of clubs or organizations: Not on file     Relationship status: Not on file    Intimate partner violence     Fear of current or ex partner: Not on file     Emotionally abused: Not on file     Physically abused: Not on file     Forced sexual activity: Not on file   Other Topics Concern    Not on file   Social History Narrative    Not on file       Current Outpatient Medications:     metFORMIN (GLUCOPHAGE) 500 MG tablet, Take 2 tablets by mouth 2 times daily (with meals), Disp: 360 tablet, Rfl: 3    lisinopril (PRINIVIL;ZESTRIL) 10 MG tablet, TAKE ONE TABLET BY 2019         Wt Readings from Last 3 Encounters:   07/10/20 171 lb 9.6 oz (77.8 kg)   03/02/20 176 lb (79.8 kg)   12/02/19 170 lb 12.8 oz (77.5 kg)         A&o  /82   Pulse 66   Temp 97 °F (36.1 °C) (Infrared)   Resp 15   Ht 5' 11\" (1.803 m)   Wt 171 lb 9.6 oz (77.8 kg)   SpO2 97%   BMI 23.93 kg/m²   Neck no bruit no TMg  Car reg no MGR  Lungs cta  Ext no edema       Diagnosis Orders   1. Controlled type 2 diabetes mellitus without complication, without long-term current use of insulin (HCC)   DIABETES FOOT EXAM    POCT glycosylated hemoglobin (Hb A1C)    MICROALBUMIN / CREATININE URINE RATIO    Comprehensive Metabolic Panel    metFORMIN (GLUCOPHAGE) 500 MG tablet    a1c good ==   6.8 check renal, ANDIE   2. Essential hypertension  Comprehensive Metabolic Panel    at goal cont ace i check renal   3. Mixed hyperlipidemia  Comprehensive Metabolic Panel    LFTs on statin continue   4. Benign prostatic hyperplasia with nocturia      continue flomax finasteride   5. Screening for malignant neoplasm of prostate  Psa screening    psa today   6.  Controlled type 2 diabetes mellitus without complication, without long-term current use of insulin (HCC)   DIABETES FOOT EXAM    POCT glycosylated hemoglobin (Hb A1C)    MICROALBUMIN / CREATININE URINE RATIO    Comprehensive Metabolic Panel    metFORMIN (GLUCOPHAGE) 500 MG tablet    a1c is at goal = 6. check renal     Orders Placed This Encounter   Procedures    MICROALBUMIN / CREATININE URINE RATIO    Comprehensive Metabolic Panel    Psa screening    POCT glycosylated hemoglobin (Hb A1C)     DIABETES FOOT EXAM able to follow single-step instructions

## 2021-06-14 NOTE — PHYSICAL THERAPY INITIAL EVALUATION ADULT - ADDITIONAL COMMENTS
Patient states she has help from sister and Home Health Aide - poor historian ,DANN/SUSANA to get more details.

## 2021-06-14 NOTE — PHYSICAL THERAPY INITIAL EVALUATION ADULT - PERTINENT HX OF CURRENT PROBLEM, REHAB EVAL
This is a 56 y.o. woman with DM II, diabetic neuropathy, fibromyalgia, and chronic back pain now with b/l LE edema.

## 2021-06-15 LAB
ANION GAP SERPL CALC-SCNC: 12 MMOL/L — SIGNIFICANT CHANGE UP (ref 7–14)
BUN SERPL-MCNC: 14 MG/DL — SIGNIFICANT CHANGE UP (ref 7–23)
CALCIUM SERPL-MCNC: 9 MG/DL — SIGNIFICANT CHANGE UP (ref 8.4–10.5)
CHLORIDE SERPL-SCNC: 100 MMOL/L — SIGNIFICANT CHANGE UP (ref 98–107)
CO2 SERPL-SCNC: 24 MMOL/L — SIGNIFICANT CHANGE UP (ref 22–31)
CREAT SERPL-MCNC: 1.08 MG/DL — SIGNIFICANT CHANGE UP (ref 0.5–1.3)
GLUCOSE BLDC GLUCOMTR-MCNC: 210 MG/DL — HIGH (ref 70–99)
GLUCOSE BLDC GLUCOMTR-MCNC: 250 MG/DL — HIGH (ref 70–99)
GLUCOSE BLDC GLUCOMTR-MCNC: 267 MG/DL — HIGH (ref 70–99)
GLUCOSE BLDC GLUCOMTR-MCNC: 284 MG/DL — HIGH (ref 70–99)
GLUCOSE BLDC GLUCOMTR-MCNC: 285 MG/DL — HIGH (ref 70–99)
GLUCOSE SERPL-MCNC: 202 MG/DL — HIGH (ref 70–99)
HCT VFR BLD CALC: 32.2 % — LOW (ref 34.5–45)
HGB BLD-MCNC: 10.2 G/DL — LOW (ref 11.5–15.5)
MAGNESIUM SERPL-MCNC: 1.8 MG/DL — SIGNIFICANT CHANGE UP (ref 1.6–2.6)
MCHC RBC-ENTMCNC: 25.4 PG — LOW (ref 27–34)
MCHC RBC-ENTMCNC: 31.7 GM/DL — LOW (ref 32–36)
MCV RBC AUTO: 80.1 FL — SIGNIFICANT CHANGE UP (ref 80–100)
NRBC # BLD: 0 /100 WBCS — SIGNIFICANT CHANGE UP
NRBC # FLD: 0 K/UL — SIGNIFICANT CHANGE UP
PHOSPHATE SERPL-MCNC: 4.2 MG/DL — SIGNIFICANT CHANGE UP (ref 2.5–4.5)
PLATELET # BLD AUTO: 254 K/UL — SIGNIFICANT CHANGE UP (ref 150–400)
POTASSIUM SERPL-MCNC: 3.1 MMOL/L — LOW (ref 3.5–5.3)
POTASSIUM SERPL-SCNC: 3.1 MMOL/L — LOW (ref 3.5–5.3)
RBC # BLD: 4.02 M/UL — SIGNIFICANT CHANGE UP (ref 3.8–5.2)
RBC # FLD: 16.3 % — HIGH (ref 10.3–14.5)
SODIUM SERPL-SCNC: 136 MMOL/L — SIGNIFICANT CHANGE UP (ref 135–145)
WBC # BLD: 7.92 K/UL — SIGNIFICANT CHANGE UP (ref 3.8–10.5)
WBC # FLD AUTO: 7.92 K/UL — SIGNIFICANT CHANGE UP (ref 3.8–10.5)

## 2021-06-15 PROCEDURE — 99233 SBSQ HOSP IP/OBS HIGH 50: CPT

## 2021-06-15 RX ORDER — INSULIN LISPRO 100/ML
5 VIAL (ML) SUBCUTANEOUS
Refills: 0 | Status: DISCONTINUED | OUTPATIENT
Start: 2021-06-15 | End: 2021-06-17

## 2021-06-15 RX ORDER — INSULIN GLARGINE 100 [IU]/ML
24 INJECTION, SOLUTION SUBCUTANEOUS AT BEDTIME
Refills: 0 | Status: DISCONTINUED | OUTPATIENT
Start: 2021-06-15 | End: 2021-06-17

## 2021-06-15 RX ADMIN — Medication 5 UNIT(S): at 17:45

## 2021-06-15 RX ADMIN — INSULIN GLARGINE 24 UNIT(S): 100 INJECTION, SOLUTION SUBCUTANEOUS at 22:15

## 2021-06-15 RX ADMIN — Medication 2 UNIT(S): at 09:07

## 2021-06-15 RX ADMIN — FENTANYL CITRATE 1 PATCH: 50 INJECTION INTRAVENOUS at 22:19

## 2021-06-15 RX ADMIN — Medication 500000 UNIT(S): at 17:43

## 2021-06-15 RX ADMIN — Medication 650 MILLIGRAM(S): at 16:42

## 2021-06-15 RX ADMIN — Medication 10 MILLIGRAM(S): at 22:15

## 2021-06-15 RX ADMIN — DULOXETINE HYDROCHLORIDE 60 MILLIGRAM(S): 30 CAPSULE, DELAYED RELEASE ORAL at 11:52

## 2021-06-15 RX ADMIN — Medication 10 MILLIGRAM(S): at 05:37

## 2021-06-15 RX ADMIN — Medication 1 TABLET(S): at 11:52

## 2021-06-15 RX ADMIN — GABAPENTIN 600 MILLIGRAM(S): 400 CAPSULE ORAL at 05:38

## 2021-06-15 RX ADMIN — Medication 3 MILLIGRAM(S): at 23:40

## 2021-06-15 RX ADMIN — Medication 10 MILLIGRAM(S): at 15:24

## 2021-06-15 RX ADMIN — Medication 500000 UNIT(S): at 05:38

## 2021-06-15 RX ADMIN — FENTANYL CITRATE 1 PATCH: 50 INJECTION INTRAVENOUS at 08:00

## 2021-06-15 RX ADMIN — GABAPENTIN 600 MILLIGRAM(S): 400 CAPSULE ORAL at 15:23

## 2021-06-15 RX ADMIN — ATORVASTATIN CALCIUM 40 MILLIGRAM(S): 80 TABLET, FILM COATED ORAL at 22:15

## 2021-06-15 RX ADMIN — Medication 40 MILLIGRAM(S): at 05:38

## 2021-06-15 RX ADMIN — Medication 1 GRAM(S): at 17:43

## 2021-06-15 RX ADMIN — Medication 6: at 17:44

## 2021-06-15 RX ADMIN — Medication 1 GRAM(S): at 11:52

## 2021-06-15 RX ADMIN — Medication 6: at 12:37

## 2021-06-15 RX ADMIN — GABAPENTIN 600 MILLIGRAM(S): 400 CAPSULE ORAL at 22:15

## 2021-06-15 RX ADMIN — LOSARTAN POTASSIUM 100 MILLIGRAM(S): 100 TABLET, FILM COATED ORAL at 05:37

## 2021-06-15 RX ADMIN — Medication 1 GRAM(S): at 05:38

## 2021-06-15 RX ADMIN — CARVEDILOL PHOSPHATE 25 MILLIGRAM(S): 80 CAPSULE, EXTENDED RELEASE ORAL at 17:43

## 2021-06-15 RX ADMIN — Medication 650 MILLIGRAM(S): at 17:30

## 2021-06-15 RX ADMIN — Medication 6: at 09:07

## 2021-06-15 RX ADMIN — Medication 500000 UNIT(S): at 11:52

## 2021-06-15 RX ADMIN — Medication 25 MILLIGRAM(S): at 05:37

## 2021-06-15 RX ADMIN — Medication 500000 UNIT(S): at 23:40

## 2021-06-15 RX ADMIN — Medication 10 MILLIGRAM(S): at 05:38

## 2021-06-15 RX ADMIN — Medication 2 UNIT(S): at 12:38

## 2021-06-15 RX ADMIN — Medication 1 GRAM(S): at 23:40

## 2021-06-15 RX ADMIN — CARVEDILOL PHOSPHATE 25 MILLIGRAM(S): 80 CAPSULE, EXTENDED RELEASE ORAL at 05:37

## 2021-06-15 RX ADMIN — PANTOPRAZOLE SODIUM 40 MILLIGRAM(S): 20 TABLET, DELAYED RELEASE ORAL at 05:37

## 2021-06-15 NOTE — PROVIDER CONTACT NOTE (OTHER) - BACKGROUND
Patient admitted for bilateral LE edema
patient came into hospital due to chest pain
patient new admit from ED, bp tends to trend high

## 2021-06-15 NOTE — PROVIDER CONTACT NOTE (OTHER) - SITUATION
patient bp 183/97
Patient hypertensive to 189/97
patient is hypertensive
pt states that she is feeling lightheaded, she states it was after taking one of her medications however she cant recall which medication
patient is hypertensive

## 2021-06-15 NOTE — PROVIDER CONTACT NOTE (OTHER) - ACTION/TREATMENT ORDERED:
provider aware, provider review med may be due to hydralazine. encourage POfluid intake and take orthostatic BP
no new orders giving all AM BP meds ordered now
Awaiting for BP med to be ordered
will continue to monitor, recheck BP with manual cuff
will continue to monitor, give PO tylenol, reassess BP in 30 minutes

## 2021-06-15 NOTE — DISCHARGE NOTE PROVIDER - PROVIDER TOKENS
FREE:[LAST:[Followup with [facility]],PHONE:[phone]],FAX:[(   )    -],ADDRESS:[[address] [place] [address]],FOLLOWUP:[1 week]]

## 2021-06-15 NOTE — DISCHARGE NOTE PROVIDER - NSDCCPCAREPLAN_GEN_ALL_CORE_FT
PRINCIPAL DISCHARGE DIAGNOSIS  Diagnosis: Chest pain  Assessment and Plan of Treatment:       SECONDARY DISCHARGE DIAGNOSES  Diagnosis: Essential hypertension  Assessment and Plan of Treatment: Essential hypertension    Diagnosis: Hyperlipidemia, unspecified hyperlipidemia type  Assessment and Plan of Treatment: Hyperlipidemia, unspecified hyperlipidemia type    Diagnosis: Fibromyalgia  Assessment and Plan of Treatment: Fibromyalgia    Diagnosis: Type 2 diabetes mellitus with diabetic polyneuropathy, with long-term current use of insulin  Assessment and Plan of Treatment: Type 2 diabetes mellitus with diabetic polyneuropathy, with long-term current use of insulin    Diagnosis: Edema  Assessment and Plan of Treatment:      PRINCIPAL DISCHARGE DIAGNOSIS  Diagnosis: Chest pain  Assessment and Plan of Treatment: Please follow up with your primary care physician regarding your hospitalization and take all medications prescribed.      SECONDARY DISCHARGE DIAGNOSES  Diagnosis: Edema  Assessment and Plan of Treatment: Please follow up with your primary care physician regarding your hospitalization and take all medications prescribed.    Diagnosis: Essential hypertension  Assessment and Plan of Treatment: Essential hypertension Please follow up with your primary care physician regarding your hospitalization and take all medications prescribed.    Diagnosis: Hyperlipidemia, unspecified hyperlipidemia type  Assessment and Plan of Treatment: Hyperlipidemia, unspecified hyperlipidemia type Please follow up with your primary care physician regarding your hospitalization and take all medications prescribed.    Diagnosis: Fibromyalgia  Assessment and Plan of Treatment: Fibromyalgia Please follow up with your primary care physician regarding your hospitalization and take all medications prescribed.    Diagnosis: Type 2 diabetes mellitus with diabetic polyneuropathy, with long-term current use of insulin  Assessment and Plan of Treatment: Type 2 diabetes mellitus with diabetic polyneuropathy, with long-term current use of insulin Please follow up with your primary care physician regarding your hospitalization and take all medications prescribed.

## 2021-06-15 NOTE — DISCHARGE NOTE PROVIDER - CARE PROVIDER_API CALL
Followup with [facility],   [address] [place] [address]  Phone: [phone]  Fax: (   )    -  Follow Up Time: 1 week

## 2021-06-15 NOTE — PROVIDER CONTACT NOTE (OTHER) - NAME OF MD/NP/PA/DO NOTIFIED:
niki lincoln
Jolie Danielson [id number]
[facility] [id number]
ACP Ali
niki lincoln

## 2021-06-15 NOTE — DISCHARGE NOTE PROVIDER - NSDCMRMEDTOKEN_GEN_ALL_CORE_FT
ALPRAZolam 2 mg oral tablet: 1 tab(s) orally 2 times a day, As Needed  amLODIPine 10 mg oral tablet: 1 tab(s) orally once a day  baclofen 10 mg oral tablet: 1 tab(s) orally 3 times a day  Basaglar KwikPen 100 units/mL subcutaneous solution: 20 unit(s) subcutaneous once a day  benzonatate 100 mg oral capsule: 1 cap(s) orally 3 times a day  carvedilol 25 mg oral tablet: 1 tab(s) orally 2 times a day  DULoxetine 30 mg oral delayed release capsule: 1 cap(s) orally 2 times a day  fentanyl topical 75 mcg/hr transdermal film, extended release (obsolete): 1 patch transdermal every 72 hours  Fioricet with Codeine 50 mg-300 mg-40 mg-30 mg oral capsule: 1 cap(s) orally every 4 hours  gabapentin 300 mg oral capsule: 1 cap(s) orally 3 times a day  hydrocodone-acetaminophen 10 mg-325 mg oral tablet: 1 tab(s) orally every 6 hours, As Needed  losartan 100 mg oral tablet: 1 tab(s) orally once a day  metFORMIN 1000 mg oral tablet: 1 tab(s) orally 2 times a day  omeprazole 40 mg oral delayed release capsule: 1 cap(s) orally once a day  sucralfate 1 g oral tablet: 1 tab(s) orally 4 times a day (before meals and at bedtime)  Vitamin D3 50,000 intl units (1250 mcg) oral capsule: 1 cap(s) orally once a week   acetaminophen 325 mg oral tablet: 2 tab(s) orally every 6 hours, As needed, Mild Pain (1 - 3), Moderate Pain (4 - 6), Severe Pain (7 - 10)  atorvastatin 40 mg oral tablet: 1 tab(s) orally once a day (at bedtime)  baclofen 10 mg oral tablet: 1 tab(s) orally 3 times a day MDD:3 tabs  Basaglar KwikPen 100 units/mL subcutaneous solution: 24 unit(s) subcutaneous once a day (at bedtime)   carvedilol 25 mg oral tablet: 1 tab(s) orally 2 times a day  Cymbalta 60 mg oral delayed release capsule: 1 cap(s) orally once a day MDD:1 tab  fentaNYL 75 mcg/hr transdermal film, extended release: 1 patch transdermal every 72 hours MDD:1  hydrALAZINE 10 mg oral tablet: 1 tab(s) orally every 8 hours  hydroCHLOROthiazide 25 mg oral tablet: 1 tab(s) orally once a day  losartan 100 mg oral tablet: 1 tab(s) orally once a day  melatonin 3 mg oral tablet: 1 tab(s) orally once a day (at bedtime), As needed, Insomnia  metFORMIN 1000 mg oral tablet: 1 tab(s) orally 2 times a day  Multiple Vitamins oral tablet: 1 tab(s) orally once a day  Neurontin 600 mg oral tablet: 1 tab(s) orally 3 times a day MDD:3 tabs  nystatin 100,000 units/mL oral suspension: 5 milliliter(s) orally 4 times a day  omeprazole 40 mg oral delayed release capsule: 1 cap(s) orally once a day  Percocet 5 mg-325 mg oral tablet: 2 tab(s) orally every 6 hours, As needed, Severe Pain (7 - 10) MDD:8 tabs  sucralfate 1 g oral tablet: 1 tab(s) orally 4 times a day (before meals and at bedtime)

## 2021-06-15 NOTE — DISCHARGE NOTE PROVIDER - HOSPITAL COURSE
Edema  - c/w lasix  - check UPC to rule-out nephrotic.   - stop amlodipine, as it can add to LE edema.   - monitor clinically.   -Doppler Neg DVT  -Renal US: No hydro   - echo- hyperdynamic left ventricle otherwise normal echo    Essential hypertension:   -Continue with home regimen  - hold amlodipine.   - Hydralazine started for better BP control     Hyperlipidemia    - Atorvastatin started    Fibromyalgia.     - Continue with duloxetine.     Type 2 diabetes mellitus with diabetic polyneuropathy, with long-term current use of insulin.  Plan: - Carbohydrate restricted diet  - Start on basaglar 15 units Daily as patient was not routinely taking metformin or the 20 units dose of basaglar at home  - will transition to 15units qHS tomorrow. and give NPH 8U tomorrow AM.   - HBA1c in AM  - Continue with gabapentin for treatment of the neuropathy  - her LE symptoms likely neuropathic symptoms. can increase gabapentin for symptom control.     Prophylactic measure.    - b/l LE venodynes.     56 y.o. woman with HTN, HLD, DM II on insulin, diabetic neuropathy, fibromyalgia, and chronic back pain now with b/l LE edema. edema improving with diuretics, but still with b/l LE neuropathic pain.      Edema, unspecified type.  Plan: Unclear etiology, likely venous insuffiencey.   - TTE to rule out CHF: normal EF   - s/p  Furosemide 40 mg IV BID. Will discontinue IV  lasix for now given negative BNP and clear lungs. Will switch to 40mg PO daily pending TTE. TTE unremarkable likely secondary to venous insuffiencey,   - check UPC to rule-out nephrotic. - proteinuria   - stop amlodipine, as it can add to LE edema.   - monitor clinically.  - BNP neg, UPCL proteinuria. Lower extremity dopplers negative.      Essential hypertension.  Plan: - Continue with home regimen - losartan 100mg and coreg 25mg   - hold amlodipine given concern about lower leg edema.   - Will start HCTZ 25mg daily to help with lower leg edema   - will start Hydralazine if needed for BP control. - 10mg TID.     Hyperlipidemia, unspecified hyperlipidemia type.  Plan: - Lipid profile in AM  - Start atorvastatin 40mg.      Fibromyalgia.  Plan: -Will increase  duloxetine 30 to 60mg daily   - c/w fentanyl patch  - on home hydrocodone, switch to percocet in patient.     Type 2 diabetes mellitus with diabetic polyneuropathy, with long-term current use of insulin.  Plan: - Carbohydrate restricted diet  - c/w lantus qhs, ISS pre-meal. will increase lantus to 24 from  22 units, and added standing pre-meal  5 units from 2 units   - Continue with gabapentin for treatment of the neuropathy  - her LE symptoms likely neuropathic symptoms. increase gabapentin for symptom control. to 600mg daily.

## 2021-06-15 NOTE — PROVIDER CONTACT NOTE (OTHER) - ASSESSMENT
Patient in no acute distress. Vital signs per flowsheet
no acute changes noted patient currently resting in bed comfortably
patient is A&Ox4, on room air, no SOB or chest pain, is complaining of a headache
patient is A&Ox4, on room air, no SOB or chest pain, is complaining of a headache
AOx4, resting in bed in no acute distress at this time /87 HR 84

## 2021-06-15 NOTE — DISCHARGE NOTE PROVIDER - NSFOLLOWUPCLINICS_GEN_ALL_ED_FT
[facility]  Internal Medicine  [address] [place] [address]  Phone: [phone]  Fax: [phone]  Follow Up Time: 1 week

## 2021-06-16 LAB
ANION GAP SERPL CALC-SCNC: 18 MMOL/L — HIGH (ref 7–14)
BUN SERPL-MCNC: 20 MG/DL — SIGNIFICANT CHANGE UP (ref 7–23)
CALCIUM SERPL-MCNC: 9.6 MG/DL — SIGNIFICANT CHANGE UP (ref 8.4–10.5)
CHLORIDE SERPL-SCNC: 98 MMOL/L — SIGNIFICANT CHANGE UP (ref 98–107)
CO2 SERPL-SCNC: 22 MMOL/L — SIGNIFICANT CHANGE UP (ref 22–31)
CREAT SERPL-MCNC: 1.22 MG/DL — SIGNIFICANT CHANGE UP (ref 0.5–1.3)
GLUCOSE BLDC GLUCOMTR-MCNC: 191 MG/DL — HIGH (ref 70–99)
GLUCOSE BLDC GLUCOMTR-MCNC: 236 MG/DL — HIGH (ref 70–99)
GLUCOSE BLDC GLUCOMTR-MCNC: 237 MG/DL — HIGH (ref 70–99)
GLUCOSE BLDC GLUCOMTR-MCNC: 264 MG/DL — HIGH (ref 70–99)
GLUCOSE SERPL-MCNC: 186 MG/DL — HIGH (ref 70–99)
HCT VFR BLD CALC: 35.3 % — SIGNIFICANT CHANGE UP (ref 34.5–45)
HGB BLD-MCNC: 11.1 G/DL — LOW (ref 11.5–15.5)
MAGNESIUM SERPL-MCNC: 2 MG/DL — SIGNIFICANT CHANGE UP (ref 1.6–2.6)
MCHC RBC-ENTMCNC: 25.1 PG — LOW (ref 27–34)
MCHC RBC-ENTMCNC: 31.4 GM/DL — LOW (ref 32–36)
MCV RBC AUTO: 79.9 FL — LOW (ref 80–100)
NRBC # BLD: 0 /100 WBCS — SIGNIFICANT CHANGE UP
NRBC # FLD: 0 K/UL — SIGNIFICANT CHANGE UP
PHOSPHATE SERPL-MCNC: 4.1 MG/DL — SIGNIFICANT CHANGE UP (ref 2.5–4.5)
PLATELET # BLD AUTO: 281 K/UL — SIGNIFICANT CHANGE UP (ref 150–400)
POTASSIUM SERPL-MCNC: 3.5 MMOL/L — SIGNIFICANT CHANGE UP (ref 3.5–5.3)
POTASSIUM SERPL-SCNC: 3.5 MMOL/L — SIGNIFICANT CHANGE UP (ref 3.5–5.3)
RBC # BLD: 4.42 M/UL — SIGNIFICANT CHANGE UP (ref 3.8–5.2)
RBC # FLD: 16.5 % — HIGH (ref 10.3–14.5)
SODIUM SERPL-SCNC: 138 MMOL/L — SIGNIFICANT CHANGE UP (ref 135–145)
WBC # BLD: 7.69 K/UL — SIGNIFICANT CHANGE UP (ref 3.8–10.5)
WBC # FLD AUTO: 7.69 K/UL — SIGNIFICANT CHANGE UP (ref 3.8–10.5)

## 2021-06-16 PROCEDURE — 99232 SBSQ HOSP IP/OBS MODERATE 35: CPT

## 2021-06-16 RX ORDER — FENTANYL CITRATE 50 UG/ML
1 INJECTION INTRAVENOUS
Qty: 10 | Refills: 0
Start: 2021-06-16 | End: 2021-07-15

## 2021-06-16 RX ORDER — OMEPRAZOLE 10 MG/1
1 CAPSULE, DELAYED RELEASE ORAL
Qty: 0 | Refills: 0 | DISCHARGE

## 2021-06-16 RX ORDER — INSULIN GLARGINE 100 [IU]/ML
24 INJECTION, SOLUTION SUBCUTANEOUS
Qty: 10 | Refills: 0
Start: 2021-06-16 | End: 2021-07-15

## 2021-06-16 RX ORDER — DULOXETINE HYDROCHLORIDE 30 MG/1
1 CAPSULE, DELAYED RELEASE ORAL
Qty: 0 | Refills: 0 | DISCHARGE

## 2021-06-16 RX ORDER — LOSARTAN POTASSIUM 100 MG/1
1 TABLET, FILM COATED ORAL
Qty: 30 | Refills: 0
Start: 2021-06-16 | End: 2021-07-15

## 2021-06-16 RX ORDER — SUCRALFATE 1 G
1 TABLET ORAL
Qty: 0 | Refills: 0 | DISCHARGE

## 2021-06-16 RX ORDER — CHOLECALCIFEROL (VITAMIN D3) 125 MCG
1 CAPSULE ORAL
Qty: 0 | Refills: 0 | DISCHARGE

## 2021-06-16 RX ORDER — ACETAMINOPHEN 500 MG
2 TABLET ORAL
Qty: 0 | Refills: 0 | DISCHARGE
Start: 2021-06-16

## 2021-06-16 RX ORDER — ACETAMINOPHEN 500 MG
650 TABLET ORAL ONCE
Refills: 0 | Status: COMPLETED | OUTPATIENT
Start: 2021-06-16 | End: 2021-06-16

## 2021-06-16 RX ORDER — GABAPENTIN 400 MG/1
1 CAPSULE ORAL
Qty: 0 | Refills: 0 | DISCHARGE

## 2021-06-16 RX ORDER — HYDRALAZINE HCL 50 MG
1 TABLET ORAL
Qty: 90 | Refills: 0
Start: 2021-06-16 | End: 2021-07-15

## 2021-06-16 RX ORDER — OXYCODONE AND ACETAMINOPHEN 5; 325 MG/1; MG/1
2 TABLET ORAL
Qty: 24 | Refills: 0
Start: 2021-06-16 | End: 2021-06-18

## 2021-06-16 RX ORDER — CARVEDILOL PHOSPHATE 80 MG/1
1 CAPSULE, EXTENDED RELEASE ORAL
Qty: 0 | Refills: 0 | DISCHARGE

## 2021-06-16 RX ORDER — INSULIN GLARGINE 100 [IU]/ML
20 INJECTION, SOLUTION SUBCUTANEOUS
Qty: 0 | Refills: 0 | DISCHARGE

## 2021-06-16 RX ORDER — METFORMIN HYDROCHLORIDE 850 MG/1
1 TABLET ORAL
Qty: 60 | Refills: 0
Start: 2021-06-16 | End: 2021-07-15

## 2021-06-16 RX ORDER — NYSTATIN 500MM UNIT
5 POWDER (EA) MISCELLANEOUS
Qty: 140 | Refills: 0
Start: 2021-06-16 | End: 2021-06-22

## 2021-06-16 RX ORDER — ALPRAZOLAM 0.25 MG
1 TABLET ORAL
Qty: 0 | Refills: 0 | DISCHARGE

## 2021-06-16 RX ORDER — METFORMIN HYDROCHLORIDE 850 MG/1
1 TABLET ORAL
Qty: 0 | Refills: 0 | DISCHARGE

## 2021-06-16 RX ORDER — LANOLIN ALCOHOL/MO/W.PET/CERES
1 CREAM (GRAM) TOPICAL
Qty: 0 | Refills: 0 | DISCHARGE
Start: 2021-06-16

## 2021-06-16 RX ORDER — DULOXETINE HYDROCHLORIDE 30 MG/1
1 CAPSULE, DELAYED RELEASE ORAL
Qty: 30 | Refills: 0
Start: 2021-06-16 | End: 2021-07-15

## 2021-06-16 RX ORDER — ATORVASTATIN CALCIUM 80 MG/1
1 TABLET, FILM COATED ORAL
Qty: 30 | Refills: 0
Start: 2021-06-16 | End: 2021-07-15

## 2021-06-16 RX ORDER — SUCRALFATE 1 G
1 TABLET ORAL
Qty: 120 | Refills: 0
Start: 2021-06-16 | End: 2021-07-15

## 2021-06-16 RX ORDER — OMEPRAZOLE 10 MG/1
1 CAPSULE, DELAYED RELEASE ORAL
Qty: 30 | Refills: 0
Start: 2021-06-16 | End: 2021-07-15

## 2021-06-16 RX ORDER — CARVEDILOL PHOSPHATE 80 MG/1
1 CAPSULE, EXTENDED RELEASE ORAL
Qty: 60 | Refills: 0
Start: 2021-06-16 | End: 2021-07-15

## 2021-06-16 RX ORDER — LOSARTAN POTASSIUM 100 MG/1
1 TABLET, FILM COATED ORAL
Qty: 0 | Refills: 0 | DISCHARGE

## 2021-06-16 RX ORDER — GABAPENTIN 400 MG/1
1 CAPSULE ORAL
Qty: 90 | Refills: 0
Start: 2021-06-16 | End: 2021-07-15

## 2021-06-16 RX ORDER — BACLOFEN 100 %
1 POWDER (GRAM) MISCELLANEOUS
Qty: 0 | Refills: 0 | DISCHARGE

## 2021-06-16 RX ORDER — AMLODIPINE BESYLATE 2.5 MG/1
1 TABLET ORAL
Qty: 0 | Refills: 0 | DISCHARGE

## 2021-06-16 RX ORDER — BACLOFEN 100 %
1 POWDER (GRAM) MISCELLANEOUS
Qty: 21 | Refills: 0
Start: 2021-06-16 | End: 2021-06-22

## 2021-06-16 RX ADMIN — FENTANYL CITRATE 1 PATCH: 50 INJECTION INTRAVENOUS at 12:27

## 2021-06-16 RX ADMIN — Medication 1 GRAM(S): at 21:23

## 2021-06-16 RX ADMIN — PANTOPRAZOLE SODIUM 40 MILLIGRAM(S): 20 TABLET, DELAYED RELEASE ORAL at 05:33

## 2021-06-16 RX ADMIN — FENTANYL CITRATE 1 PATCH: 50 INJECTION INTRAVENOUS at 12:25

## 2021-06-16 RX ADMIN — GABAPENTIN 600 MILLIGRAM(S): 400 CAPSULE ORAL at 21:21

## 2021-06-16 RX ADMIN — Medication 1 GRAM(S): at 18:36

## 2021-06-16 RX ADMIN — Medication 2: at 09:05

## 2021-06-16 RX ADMIN — Medication 4: at 18:36

## 2021-06-16 RX ADMIN — Medication 5 UNIT(S): at 12:46

## 2021-06-16 RX ADMIN — Medication 500000 UNIT(S): at 21:23

## 2021-06-16 RX ADMIN — CARVEDILOL PHOSPHATE 25 MILLIGRAM(S): 80 CAPSULE, EXTENDED RELEASE ORAL at 05:33

## 2021-06-16 RX ADMIN — GABAPENTIN 600 MILLIGRAM(S): 400 CAPSULE ORAL at 05:33

## 2021-06-16 RX ADMIN — Medication 10 MILLIGRAM(S): at 21:21

## 2021-06-16 RX ADMIN — FENTANYL CITRATE 1 PATCH: 50 INJECTION INTRAVENOUS at 19:39

## 2021-06-16 RX ADMIN — Medication 10 MILLIGRAM(S): at 12:27

## 2021-06-16 RX ADMIN — Medication 1 TABLET(S): at 12:26

## 2021-06-16 RX ADMIN — ATORVASTATIN CALCIUM 40 MILLIGRAM(S): 80 TABLET, FILM COATED ORAL at 21:22

## 2021-06-16 RX ADMIN — Medication 25 MILLIGRAM(S): at 05:33

## 2021-06-16 RX ADMIN — Medication 3 MILLIGRAM(S): at 21:21

## 2021-06-16 RX ADMIN — Medication 650 MILLIGRAM(S): at 12:27

## 2021-06-16 RX ADMIN — Medication 1 GRAM(S): at 12:26

## 2021-06-16 RX ADMIN — Medication 1 GRAM(S): at 05:32

## 2021-06-16 RX ADMIN — LOSARTAN POTASSIUM 100 MILLIGRAM(S): 100 TABLET, FILM COATED ORAL at 05:33

## 2021-06-16 RX ADMIN — INSULIN GLARGINE 24 UNIT(S): 100 INJECTION, SOLUTION SUBCUTANEOUS at 21:19

## 2021-06-16 RX ADMIN — Medication 500000 UNIT(S): at 18:37

## 2021-06-16 RX ADMIN — Medication 10 MILLIGRAM(S): at 05:33

## 2021-06-16 RX ADMIN — Medication 5 UNIT(S): at 09:06

## 2021-06-16 RX ADMIN — Medication 4: at 12:45

## 2021-06-16 RX ADMIN — Medication 500000 UNIT(S): at 05:32

## 2021-06-16 RX ADMIN — Medication 650 MILLIGRAM(S): at 13:25

## 2021-06-16 RX ADMIN — Medication 500000 UNIT(S): at 12:26

## 2021-06-16 RX ADMIN — DULOXETINE HYDROCHLORIDE 60 MILLIGRAM(S): 30 CAPSULE, DELAYED RELEASE ORAL at 12:26

## 2021-06-16 RX ADMIN — CARVEDILOL PHOSPHATE 25 MILLIGRAM(S): 80 CAPSULE, EXTENDED RELEASE ORAL at 18:36

## 2021-06-16 RX ADMIN — FENTANYL CITRATE 1 PATCH: 50 INJECTION INTRAVENOUS at 07:31

## 2021-06-16 RX ADMIN — Medication 5 UNIT(S): at 18:36

## 2021-06-16 RX ADMIN — GABAPENTIN 600 MILLIGRAM(S): 400 CAPSULE ORAL at 12:27

## 2021-06-16 RX ADMIN — Medication 1: at 21:20

## 2021-06-16 NOTE — PROGRESS NOTE ADULT - PROBLEM SELECTOR PROBLEM 5
Type 2 diabetes mellitus with diabetic polyneuropathy, with long-term current use of insulin

## 2021-06-16 NOTE — PROGRESS NOTE ADULT - SUBJECTIVE AND OBJECTIVE BOX
[facility] Division of Hospital Medicine  Hamlet Goldberg MD  In House Pager [phone]    Patient is a 56y old  Female who presents with a chief complaint of Lower extremity edema (2021 15:33)      SUBJECTIVE / OVERNIGHT EVENTS:  No overnight events. Labs and vitals reviewed.  Patient seen and examined at bedside, no acute complaints. still have the same neuropathic symptoms of burning and numbness in LE. patient also endorse chronic low back and knee pain. she follows with chronic pain in [place].   No fever, no chills, no SOB, no CP, no n/v/d, no abd pain, no dysuria.      MEDICATIONS  (STANDING):  baclofen 10 milliGRAM(s) Oral three times a day  carvedilol 25 milliGRAM(s) Oral every 12 hours  dextrose 40% Gel 15 Gram(s) Oral once  dextrose 5%. 1000 milliLiter(s) (50 mL/Hr) IV Continuous <Continuous>  dextrose 5%. 1000 milliLiter(s) (100 mL/Hr) IV Continuous <Continuous>  dextrose 50% Injectable 25 Gram(s) IV Push once  dextrose 50% Injectable 12.5 Gram(s) IV Push once  dextrose 50% Injectable 25 Gram(s) IV Push once  DULoxetine 30 milliGRAM(s) Oral daily  fentaNYL   Patch  75 MICROgram(s)/Hr 1 Patch Transdermal every 72 hours  furosemide   Injectable 40 milliGRAM(s) IV Push two times a day  gabapentin 400 milliGRAM(s) Oral three times a day  glucagon  Injectable 1 milliGRAM(s) IntraMuscular once  insulin glargine Injectable (LANTUS) 20 Unit(s) SubCutaneous at bedtime  insulin lispro (ADMELOG) corrective regimen sliding scale   SubCutaneous at bedtime  insulin lispro (ADMELOG) corrective regimen sliding scale   SubCutaneous three times a day before meals  losartan 100 milliGRAM(s) Oral daily  multivitamin 1 Tablet(s) Oral daily  nystatin    Suspension 340098 Unit(s) Oral four times a day  pantoprazole    Tablet 40 milliGRAM(s) Oral before breakfast  sucralfate 1 Gram(s) Oral four times a day    MEDICATIONS  (PRN):  acetaminophen   Tablet .. 650 milliGRAM(s) Oral every 6 hours PRN Mild Pain (1 - 3), Moderate Pain (4 - 6), Severe Pain (7 - 10)  diphenhydrAMINE 25 milliGRAM(s) Oral every 4 hours PRN Rash and/or Itching  guaiFENesin Oral Liquid (Sugar-Free) 100 milliGRAM(s) Oral every 6 hours PRN Cough  oxycodone    5 mG/acetaminophen 325 mG 2 Tablet(s) Oral every 6 hours PRN Severe Pain (7 - 10)  oxycodone    5 mG/acetaminophen 325 mG 1 Tablet(s) Oral every 6 hours PRN Moderate Pain (4 - 6)    CAPILLARY BLOOD GLUCOSE      POCT Blood Glucose.: 295 mg/dL (2021 12:21)  POCT Blood Glucose.: 258 mg/dL (2021 08:32)  POCT Blood Glucose.: 292 mg/dL (2021 22:13)  POCT Blood Glucose.: 273 mg/dL (2021 17:50)    I&O's Summary      PHYSICAL EXAM:  Vital Signs Last 24 Hrs  T(C): 36.7 (2021 11:06), Max: 36.9 (2021 23:10)  T(F): 98.1 (2021 11:06), Max: 98.5 (2021 23:10)  HR: 84 (2021 11:06) (77 - 86)  BP: 157/93 (2021 11:06) (157/93 - 183/97)  BP(mean): --  RR: 18 (2021 11:06) (16 - 18)  SpO2: 94% (2021 11:06) (94% - 100%)    Gen: NAD; resting in bed.  Pulm: no respiratory distress; CTA b/l; no wheezing  Cards: RRR, nl S1/S2; no obvious murmurs  Abd: soft; NT on exam  Ext: no cyanosis; 1+ LE edema. b/l calf tenderness on palpation.   Skin: no rash; no cyanosis    LABS:                        10.8   6.01  )-----------( 212      ( 2021 07:24 )             33.7     06-13    141  |  103  |  6<L>  ----------------------------<  217<H>  3.3<L>   |  24  |  1.07    Ca    9.6      2021 07:24  Phos  4.3     06-13  Mg     1.8     -13    TPro  7.5  /  Alb  3.7  /  TBili  <0.2  /  DBili  x   /  AST  13  /  ALT  10  /  AlkPhos  119  06-11          Urinalysis Basic - ( 2021 01:03 )    Color: Light Yellow / Appearance: Clear / S.012 / pH: x  Gluc: x / Ketone: Negative  / Bili: Negative / Urobili: <2 mg/dL   Blood: x / Protein: 300 mg/dL / Nitrite: Negative   Leuk Esterase: Negative / RBC: 1 /HPF / WBC 2 /HPF   Sq Epi: x / Non Sq Epi: 1 /HPF / Bacteria: Negative          RADIOLOGY & ADDITIONAL TESTS:  Results Reviewed: Y  Imaging Personally Reviewed: Y  Electrocardiogram Personally Reviewed: Y    COORDINATION OF CARE:  Care Discussed with Consultants/Other Providers [Y/N]: Y  Prior or Outpatient Records Reviewed [Y/N]: Y  
[facility]  Division of Hospital Medicine  Josephine Marion MD  Pager: [phone]      Patient is a 56y old  Female who presents with a chief complaint of Lower extremity edema (13 Jun 2021 14:43)      SUBJECTIVE / OVERNIGHT EVENTS: Pt reports neuropathic pain. However, able to work with PT. Still with lower leg edema.   ADDITIONAL REVIEW OF SYSTEMS:    MEDICATIONS  (STANDING):  baclofen 10 milliGRAM(s) Oral three times a day  carvedilol 25 milliGRAM(s) Oral every 12 hours  dextrose 40% Gel 15 Gram(s) Oral once  dextrose 5%. 1000 milliLiter(s) (50 mL/Hr) IV Continuous <Continuous>  dextrose 5%. 1000 milliLiter(s) (100 mL/Hr) IV Continuous <Continuous>  dextrose 50% Injectable 25 Gram(s) IV Push once  dextrose 50% Injectable 12.5 Gram(s) IV Push once  dextrose 50% Injectable 25 Gram(s) IV Push once  DULoxetine 30 milliGRAM(s) Oral daily  fentaNYL   Patch  75 MICROgram(s)/Hr 1 Patch Transdermal every 72 hours  gabapentin 600 milliGRAM(s) Oral three times a day  glucagon  Injectable 1 milliGRAM(s) IntraMuscular once  hydrALAZINE 10 milliGRAM(s) Oral every 8 hours  insulin glargine Injectable (LANTUS) 20 Unit(s) SubCutaneous at bedtime  insulin lispro (ADMELOG) corrective regimen sliding scale   SubCutaneous at bedtime  insulin lispro (ADMELOG) corrective regimen sliding scale   SubCutaneous three times a day before meals  losartan 100 milliGRAM(s) Oral daily  multivitamin 1 Tablet(s) Oral daily  nystatin    Suspension 296552 Unit(s) Oral four times a day  pantoprazole    Tablet 40 milliGRAM(s) Oral before breakfast  sucralfate 1 Gram(s) Oral four times a day    MEDICATIONS  (PRN):  acetaminophen   Tablet .. 650 milliGRAM(s) Oral every 6 hours PRN Mild Pain (1 - 3), Moderate Pain (4 - 6), Severe Pain (7 - 10)  diphenhydrAMINE 25 milliGRAM(s) Oral every 4 hours PRN Rash and/or Itching  guaiFENesin Oral Liquid (Sugar-Free) 100 milliGRAM(s) Oral every 6 hours PRN Cough  oxycodone    5 mG/acetaminophen 325 mG 2 Tablet(s) Oral every 6 hours PRN Severe Pain (7 - 10)  oxycodone    5 mG/acetaminophen 325 mG 1 Tablet(s) Oral every 6 hours PRN Moderate Pain (4 - 6)      CAPILLARY BLOOD GLUCOSE      POCT Blood Glucose.: 299 mg/dL (14 Jun 2021 12:28)  POCT Blood Glucose.: 270 mg/dL (14 Jun 2021 08:43)  POCT Blood Glucose.: 291 mg/dL (13 Jun 2021 23:15)  POCT Blood Glucose.: 276 mg/dL (13 Jun 2021 17:35)    I&O's Summary      PHYSICAL EXAM:  Vital Signs Last 24 Hrs  T(C): 36.5 (14 Jun 2021 11:42), Max: 36.8 (14 Jun 2021 05:43)  T(F): 97.7 (14 Jun 2021 11:42), Max: 98.2 (14 Jun 2021 05:43)  HR: 85 (14 Jun 2021 12:45) (74 - 88)  BP: 142/85 (14 Jun 2021 12:45) (142/85 - 200/98)  BP(mean): --  RR: 17 (14 Jun 2021 11:42) (17 - 17)  SpO2: 97% (14 Jun 2021 11:42) (97% - 100%)  Gen: Middle age woman in NAD; resting in bed.  Pulm: no respiratory distress; CTA b/l; no wheezing  Cards: RRR, nl S1/S2; no obvious murmurs  Abd: soft; NT on exam  Ext: no cyanosis; 1+ LE edema. b/l calf tenderness on palpation.   Skin: no rash; no cyanosis      LABS:                        10.8   7.34  )-----------( 251      ( 14 Jun 2021 06:48 )             34.5     06-14    138  |  102  |  12  ----------------------------<  238<H>  3.5   |  23  |  1.20    Ca    9.4      14 Jun 2021 06:48  Phos  3.7     06-14  Mg     1.7     06-14                  RADIOLOGY & ADDITIONAL TESTS:  Results Reviewed:   Imaging Personally Reviewed:  Electrocardiogram Personally Reviewed:    COORDINATION OF CARE:  Care Discussed with Consultants/Other Providers [Y/N]:  Prior or Outpatient Records Reviewed [Y/N]:  
[facility]  Division of Hospital Medicine  Josephine Marion MD  Pager: [phone]      Patient is a 56y old  Female who presents with a chief complaint of Lower extremity edema (14 Jun 2021 14:26)      SUBJECTIVE / OVERNIGHT EVENTS: Patient reports pain has improved. TTE unremarkable. Will discontinue lasix. Discussed discharge plan for tomorrow.   ADDITIONAL REVIEW OF SYSTEMS:    MEDICATIONS  (STANDING):  atorvastatin 40 milliGRAM(s) Oral at bedtime  baclofen 10 milliGRAM(s) Oral three times a day  carvedilol 25 milliGRAM(s) Oral every 12 hours  dextrose 40% Gel 15 Gram(s) Oral once  dextrose 5%. 1000 milliLiter(s) (50 mL/Hr) IV Continuous <Continuous>  dextrose 5%. 1000 milliLiter(s) (100 mL/Hr) IV Continuous <Continuous>  dextrose 50% Injectable 25 Gram(s) IV Push once  dextrose 50% Injectable 12.5 Gram(s) IV Push once  dextrose 50% Injectable 25 Gram(s) IV Push once  DULoxetine 60 milliGRAM(s) Oral daily  fentaNYL   Patch  75 MICROgram(s)/Hr 1 Patch Transdermal every 72 hours  gabapentin 600 milliGRAM(s) Oral three times a day  glucagon  Injectable 1 milliGRAM(s) IntraMuscular once  hydrALAZINE 10 milliGRAM(s) Oral every 8 hours  hydrochlorothiazide 25 milliGRAM(s) Oral daily  insulin glargine Injectable (LANTUS) 24 Unit(s) SubCutaneous at bedtime  insulin lispro (ADMELOG) corrective regimen sliding scale   SubCutaneous three times a day before meals  insulin lispro (ADMELOG) corrective regimen sliding scale   SubCutaneous at bedtime  insulin lispro Injectable (ADMELOG) 5 Unit(s) SubCutaneous three times a day before meals  losartan 100 milliGRAM(s) Oral daily  multivitamin 1 Tablet(s) Oral daily  nystatin    Suspension 282092 Unit(s) Oral four times a day  pantoprazole    Tablet 40 milliGRAM(s) Oral before breakfast  sucralfate 1 Gram(s) Oral four times a day    MEDICATIONS  (PRN):  acetaminophen   Tablet .. 650 milliGRAM(s) Oral every 6 hours PRN Mild Pain (1 - 3), Moderate Pain (4 - 6), Severe Pain (7 - 10)  diphenhydrAMINE 25 milliGRAM(s) Oral every 4 hours PRN Rash and/or Itching  guaiFENesin Oral Liquid (Sugar-Free) 100 milliGRAM(s) Oral every 6 hours PRN Cough  melatonin 3 milliGRAM(s) Oral at bedtime PRN Insomnia  oxycodone    5 mG/acetaminophen 325 mG 2 Tablet(s) Oral every 6 hours PRN Severe Pain (7 - 10)  oxycodone    5 mG/acetaminophen 325 mG 1 Tablet(s) Oral every 6 hours PRN Moderate Pain (4 - 6)      CAPILLARY BLOOD GLUCOSE      POCT Blood Glucose.: 284 mg/dL (15 Sharath 2021 12:13)  POCT Blood Glucose.: 267 mg/dL (15 Sharath 2021 08:51)  POCT Blood Glucose.: 210 mg/dL (15 Sharath 2021 05:44)  POCT Blood Glucose.: 303 mg/dL (14 Jun 2021 21:30)  POCT Blood Glucose.: 251 mg/dL (14 Jun 2021 17:19)    I&O's Summary      PHYSICAL EXAM:  Vital Signs Last 24 Hrs  T(C): 36.9 (15 Sharath 2021 09:25), Max: 36.9 (15 Sharath 2021 09:25)  T(F): 98.4 (15 Sharath 2021 09:25), Max: 98.4 (15 Sharath 2021 09:25)  HR: 84 (15 Sharath 2021 09:25) (78 - 84)  BP: 153/87 (15 Sharath 2021 09:25) (151/82 - 176/91)  BP(mean): --  RR: 17 (15 Sharath 2021 09:25) (17 - 17)  SpO2: 94% (15 Sharath 2021 09:25) (94% - 100%)  Gen: Middle age woman in NAD; resting in bed.  Pulm: no respiratory distress; CTA b/l; no wheezing  Cards: RRR, nl S1/S2; no obvious murmurs  Abd: soft; NT on exam  Ext: no cyanosis; trace LE edema. b/l calf tenderness on palpation.   Skin: no rash; no cyanosis  LABS:                        10.2   7.92  )-----------( 254      ( 15 Sharath 2021 07:15 )             32.2     06-15    136  |  100  |  14  ----------------------------<  202<H>  3.1<L>   |  24  |  1.08    Ca    9.0      15 Sharath 2021 07:15  Phos  4.2     06-15  Mg     1.8     06-15                  RADIOLOGY & ADDITIONAL TESTS:  Results Reviewed:   Imaging Personally Reviewed: < from: Transthoracic Echocardiogram (06.14.21 @ 17:23) >  CONCLUSIONS:  1. Normal left ventricular internal dimensions and wall  thicknesses.  2. Hyperdynamic left ventricle.  3. Normal right ventricular size and function.  4. Estimated right ventricular systolic pressure equals 33  mm Hg, assuming right atrial pressure equals 10 mm Hg,  consistent with normal pulmonary pressures.  *** No previous Echo exam.    < end of copied text >    Electrocardiogram Personally Reviewed:    COORDINATION OF CARE:  Care Discussed with Consultants/Other Providers [Y/N]:  Prior or Outpatient Records Reviewed [Y/N]:  
[facility] Division of Hospital Medicine  Hamlet Goldberg MD  In House Pager [phone]    Patient is a 56y old  Female who presents with a chief complaint of Lower extremity edema (2021 05:40)      SUBJECTIVE / OVERNIGHT EVENTS:  No overnight events. Labs and vitals reviewed.   Patient seen and examined at bedside, reports numbness and burning in b/l LE.   No fever, no chills, no SOB, no CP, no n/v/d, no abd pain, no dysuria      MEDICATIONS  (STANDING):  baclofen 10 milliGRAM(s) Oral three times a day  carvedilol 25 milliGRAM(s) Oral every 12 hours  dextrose 40% Gel 15 Gram(s) Oral once  dextrose 5%. 1000 milliLiter(s) (50 mL/Hr) IV Continuous <Continuous>  dextrose 5%. 1000 milliLiter(s) (100 mL/Hr) IV Continuous <Continuous>  dextrose 50% Injectable 25 Gram(s) IV Push once  dextrose 50% Injectable 12.5 Gram(s) IV Push once  dextrose 50% Injectable 25 Gram(s) IV Push once  DULoxetine 30 milliGRAM(s) Oral daily  furosemide   Injectable 40 milliGRAM(s) IV Push two times a day  gabapentin 300 milliGRAM(s) Oral three times a day  glucagon  Injectable 1 milliGRAM(s) IntraMuscular once  insulin glargine Injectable (LANTUS) 15 Unit(s) SubCutaneous every morning  insulin lispro (ADMELOG) corrective regimen sliding scale   SubCutaneous three times a day before meals  losartan 100 milliGRAM(s) Oral daily  multivitamin 1 Tablet(s) Oral daily  nystatin    Suspension 299681 Unit(s) Oral four times a day  pantoprazole    Tablet 40 milliGRAM(s) Oral before breakfast  sucralfate 1 Gram(s) Oral four times a day    MEDICATIONS  (PRN):  diphenhydrAMINE 25 milliGRAM(s) Oral every 4 hours PRN Rash and/or Itching    CAPILLARY BLOOD GLUCOSE      POCT Blood Glucose.: 321 mg/dL (2021 12:46)  POCT Blood Glucose.: 242 mg/dL (2021 09:19)  POCT Blood Glucose.: 182 mg/dL (2021 01:05)  POCT Blood Glucose.: 238 mg/dL (2021 17:43)    I&O's Summary      PHYSICAL EXAM:  Vital Signs Last 24 Hrs  T(C): 37.1 (2021 12:50), Max: 37.2 (2021 01:34)  T(F): 98.7 (2021 12:50), Max: 98.9 (2021 01:34)  HR: 87 (2021 12:50) (60 - 92)  BP: 172/87 (2021 12:50) (159/96 - 189/97)  BP(mean): --  RR: 17 (2021 12:50) (16 - 18)  SpO2: 100% (2021 12:50) (100% - 100%)    Gen: NAD; resting in bed.  Pulm: no respiratory distress; CTA b/l; no wheezing  Cards: RRR, nl S1/S2; no obvious murmurs  Abd: soft; NT on exam  Ext: no cyanosis; 2+ pitting edema in LE.   Skin: no rash; no cyanosis    LABS:                        9.6    6.39  )-----------( 180      ( 2021 07:50 )             30.3     06-12    137  |  103  |  9   ----------------------------<  230<H>  3.8   |  21<L>  |  1.06    Ca    9.1      2021 07:50  Phos  3.3     06-12  Mg     1.7     06-12    TPro  7.5  /  Alb  3.7  /  TBili  <0.2  /  DBili  x   /  AST  13  /  ALT  10  /  AlkPhos  119  06-11          Urinalysis Basic - ( 2021 01:03 )    Color: Light Yellow / Appearance: Clear / S.012 / pH: x  Gluc: x / Ketone: Negative  / Bili: Negative / Urobili: <2 mg/dL   Blood: x / Protein: 300 mg/dL / Nitrite: Negative   Leuk Esterase: Negative / RBC: 1 /HPF / WBC 2 /HPF   Sq Epi: x / Non Sq Epi: 1 /HPF / Bacteria: Negative          RADIOLOGY & ADDITIONAL TESTS:  Results Reviewed: Y  Imaging Personally Reviewed: Y  Electrocardiogram Personally Reviewed: Y    COORDINATION OF CARE:  Care Discussed with Consultants/Other Providers [Y/N]: Y  Prior or Outpatient Records Reviewed [Y/N]: Y  
[facility]  Division of Hospital Medicine  Josephine Marion MD  Pager: [phone]      Patient is a 56y old  Female who presents with a chief complaint of Lower extremity edema (15 Sharath 2021 17:38)      SUBJECTIVE / OVERNIGHT EVENTS: This am, patient examined at bedside. Reports stable pain. Was previously on 15 units of lantus. Discussed that patient would be discharged on increase insulin dose given steroids requirement. Encouraged patient to elevate leg.   ADDITIONAL REVIEW OF SYSTEMS:    MEDICATIONS  (STANDING):  atorvastatin 40 milliGRAM(s) Oral at bedtime  baclofen 10 milliGRAM(s) Oral three times a day  carvedilol 25 milliGRAM(s) Oral every 12 hours  dextrose 40% Gel 15 Gram(s) Oral once  dextrose 5%. 1000 milliLiter(s) (50 mL/Hr) IV Continuous <Continuous>  dextrose 5%. 1000 milliLiter(s) (100 mL/Hr) IV Continuous <Continuous>  dextrose 50% Injectable 25 Gram(s) IV Push once  dextrose 50% Injectable 12.5 Gram(s) IV Push once  dextrose 50% Injectable 25 Gram(s) IV Push once  DULoxetine 60 milliGRAM(s) Oral daily  fentaNYL   Patch  75 MICROgram(s)/Hr 1 Patch Transdermal every 72 hours  gabapentin 600 milliGRAM(s) Oral three times a day  glucagon  Injectable 1 milliGRAM(s) IntraMuscular once  hydrALAZINE 10 milliGRAM(s) Oral every 8 hours  hydrochlorothiazide 25 milliGRAM(s) Oral daily  insulin glargine Injectable (LANTUS) 24 Unit(s) SubCutaneous at bedtime  insulin lispro (ADMELOG) corrective regimen sliding scale   SubCutaneous three times a day before meals  insulin lispro (ADMELOG) corrective regimen sliding scale   SubCutaneous at bedtime  insulin lispro Injectable (ADMELOG) 5 Unit(s) SubCutaneous three times a day before meals  losartan 100 milliGRAM(s) Oral daily  multivitamin 1 Tablet(s) Oral daily  nystatin    Suspension 443067 Unit(s) Oral four times a day  pantoprazole    Tablet 40 milliGRAM(s) Oral before breakfast  sucralfate 1 Gram(s) Oral four times a day    MEDICATIONS  (PRN):  acetaminophen   Tablet .. 650 milliGRAM(s) Oral every 6 hours PRN Mild Pain (1 - 3), Moderate Pain (4 - 6), Severe Pain (7 - 10)  diphenhydrAMINE 25 milliGRAM(s) Oral every 4 hours PRN Rash and/or Itching  guaiFENesin Oral Liquid (Sugar-Free) 100 milliGRAM(s) Oral every 6 hours PRN Cough  melatonin 3 milliGRAM(s) Oral at bedtime PRN Insomnia  oxycodone    5 mG/acetaminophen 325 mG 2 Tablet(s) Oral every 6 hours PRN Severe Pain (7 - 10)  oxycodone    5 mG/acetaminophen 325 mG 1 Tablet(s) Oral every 6 hours PRN Moderate Pain (4 - 6)      CAPILLARY BLOOD GLUCOSE      POCT Blood Glucose.: 236 mg/dL (16 Jun 2021 12:17)  POCT Blood Glucose.: 191 mg/dL (16 Jun 2021 08:34)  POCT Blood Glucose.: 250 mg/dL (15 Sharath 2021 21:41)  POCT Blood Glucose.: 285 mg/dL (15 Sharath 2021 17:44)    I&O's Summary      PHYSICAL EXAM:  Vital Signs Last 24 Hrs  T(C): 37.7 (16 Jun 2021 11:28), Max: 37.7 (16 Jun 2021 11:28)  T(F): 99.8 (16 Jun 2021 11:28), Max: 99.8 (16 Jun 2021 11:28)  HR: 82 (16 Jun 2021 11:28) (79 - 86)  BP: 149/63 (16 Jun 2021 11:28) (143/68 - 195/102)  BP(mean): --  RR: 18 (16 Jun 2021 11:28) (17 - 18)  SpO2: 100% (16 Jun 2021 11:28) (98% - 100%)  Gen: Middle age woman in NAD; resting in bed.  Pulm: no respiratory distress; CTA b/l; no wheezing  Cards: RRR, nl S1/S2; no obvious murmurs  Abd: soft; NT on exam  Ext: no cyanosis; Trace  LE edema. b/l calf tenderness on palpation.   Skin: no rash; no cyanosis      LABS:                        11.1   7.69  )-----------( 281      ( 16 Jun 2021 08:11 )             35.3     06-16    138  |  98  |  20  ----------------------------<  186<H>  3.5   |  22  |  1.22    Ca    9.6      16 Jun 2021 08:11  Phos  4.1     06-16  Mg     2.0     06-16                  RADIOLOGY & ADDITIONAL TESTS:  Results Reviewed:   Imaging Personally Reviewed:  Electrocardiogram Personally Reviewed:    COORDINATION OF CARE:  Care Discussed with Consultants/Other Providers [Y/N]:  Prior or Outpatient Records Reviewed [Y/N]:

## 2021-06-16 NOTE — PROGRESS NOTE ADULT - PROBLEM SELECTOR PLAN 3
- Lipid profile in AM  - Start atorvastatin 40mg
- Lipid profile in AM  - Statin therapy after labs are resulted.
- Lipid profile in AM  - Statin therapy after labs are resulted.

## 2021-06-16 NOTE — PROGRESS NOTE ADULT - REASON FOR ADMISSION
Lower extremity edema

## 2021-06-16 NOTE — PROGRESS NOTE ADULT - PROBLEM SELECTOR PLAN 6
- b/l LE afshan  dispo: tomorrow.  Patient needs follow up with chronic pain doctor and PCP on discharge (needs referral does not have  a doctor here). With short supply (5 days) of opioids to carry patient to appointment. 35 minutes spent discharge planning.
- b/l LE venodynes
- b/l LE venodynes
- b/l LE afshan  dispo: pending TTE. Patient needs follow up with chronic pain doctor and PCP on discharge (needs referral does not have  a doctor here)
- b/l LE afshan  dispo: today   Patient needs follow up with chronic pain doctor and PCP on discharge (needs referral does not have  a doctor here). With short supply (7 days) of opioids to carry patient to appointment.  and 3 days of PRN. 35 minutes spent discharge planning.

## 2021-06-16 NOTE — PROGRESS NOTE ADULT - PROBLEM SELECTOR PLAN 5
- Carbohydrate restricted diet  - c/w lantus qhs, ISS pre-meal. will increase lantus to 24 from  22 units, and added standing pre-meal  5 units from 2 units   - Continue with gabapentin for treatment of the neuropathy  - her LE symptoms likely neuropathic symptoms. increase gabapentin for symptom control. to 600mg daily  - On discharge, patient can be discharged on increase lantus 24 units.

## 2021-06-16 NOTE — PROGRESS NOTE ADULT - PROBLEM SELECTOR PLAN 2
- Continue with home regimen - losartan 100mg and coreg 25mg   - hold amlodipine given concern about lower leg edema.   - Will start HCTZ 25mg daily to help with lower leg edema   - will start Hydralazine if needed for BP control. - 10mg TID
- Continue with home regimen  - hold amlodipine.   - can start Hydralazine if needed for BP control.
- Continue with home regimen  - hold amlodipine.   - can start Hydralazine if needed for BP control.
- Continue with home regimen - losartan 100mg and coreg 25mg   - hold amlodipine given concern about lower leg edema.   - C/W HCTZ 25mg daily to help with lower leg edema   - C/W  Hydralazine if needed for BP control. - 10mg TID
- Continue with home regimen - losartan 100mg and coreg 25mg   - hold amlodipine.   - Will start HCTZ 12.5mg daily to help with lower leg edema   - can start Hydralazine if needed for BP control.

## 2021-06-16 NOTE — PROGRESS NOTE ADULT - ASSESSMENT
56 y.o. woman with HTN, HLD, DM II on insulin, diabetic neuropathy, fibromyalgia, and chronic back pain now with b/l LE edema. edema improving with diuretics, but still with b/l LE neuropathic pain. 
56 y.o. woman with HTN, HLD, DM II on insulin, diabetic neuropathy, fibromyalgia, and chronic back pain now with b/l LE edema.

## 2021-06-16 NOTE — PROGRESS NOTE ADULT - PROBLEM SELECTOR PLAN 4
- Continue with duloxetine  - c/w fentanyl patch  - on home hydrocodone, switch to percocet in patient.
-Will increase  duloxetine 30 to 60mg daily   - c/w fentanyl patch  - on home hydrocodone, switch to percocet in patient.
-C/W increase duloxetine 30 to 60mg daily   - c/w fentanyl patch  - on home hydrocodone, switch to percocet in patient.
-Will increase  duloxetine 30 to 60mg daily   - c/w fentanyl patch  - on home hydrocodone, switch to percocet in patient.
- Continue with duloxetine

## 2021-06-16 NOTE — PROGRESS NOTE ADULT - PROBLEM SELECTOR PLAN 1
Unclear etiology, likely venous insuffiencey.   - TTE to rule out CHF: normal EF   - s/p  Furosemide 40 mg IV BID. Will discontinue IV  lasix for now given negative BNP and clear lungs. TTE unremarkable likely secondary to venous insuffiencey, D/C lasix   - check UPC to rule-out nephrotic. - proteinuria   - stop amlodipine, as it can add to LE edema.   - monitor clinically.  - BNP neg, UPCL proteinuria. Lower extremity dopplers negative
Unclear etiology, likely venous insuffiency.   - TTE to rule out CHF   - s/p  Furosemide 40 mg IV BID. Will discontinue IV  lasix for now given negative BNP and clear lungs. Will switch to 40mg PO daily pending TTE   - check UPC to rule-out nephrotic. - proteinuria   - stop amlodipine, as it can add to LE edema.   - monitor clinically.  - BNP neg, UPCL proteinuria. Lower extremity dopplers negative
Unclear etiology, likely venous insuffiencey.   - TTE to rule out CHF: normal EF   - s/p  Furosemide 40 mg IV BID. Will discontinue IV  lasix for now given negative BNP and clear lungs. Will switch to 40mg PO daily pending TTE. TTE unremarkable likely secondary to venous insuffiencey,   - check UPC to rule-out nephrotic. - proteinuria   - stop amlodipine, as it can add to LE edema.   - monitor clinically.  - BNP neg, UPCL proteinuria. Lower extremity dopplers negative
- c/w Furosemide 40 mg IV BID  - check UPC to rule-out nephrotic.   - stop amlodipine, as it can add to LE edema.   - monitor clinically.
- c/w Furosemide 40 mg IV BID  - check UPC to rule-out nephrotic.   - stop amlodipine, as it can add to LE edema.   - monitor clinically.  - BNP neg, UPC pending.

## 2021-06-16 NOTE — PROGRESS NOTE ADULT - PROBLEM SELECTOR PROBLEM 1
Edema, unspecified type

## 2021-06-17 VITALS
TEMPERATURE: 98 F | RESPIRATION RATE: 16 BRPM | OXYGEN SATURATION: 94 % | HEART RATE: 85 BPM | SYSTOLIC BLOOD PRESSURE: 153 MMHG | DIASTOLIC BLOOD PRESSURE: 71 MMHG

## 2021-06-17 LAB — GLUCOSE BLDC GLUCOMTR-MCNC: 184 MG/DL — HIGH (ref 70–99)

## 2021-06-17 PROCEDURE — 99239 HOSP IP/OBS DSCHRG MGMT >30: CPT

## 2021-06-17 RX ADMIN — Medication 1 TABLET(S): at 09:40

## 2021-06-17 RX ADMIN — Medication 5 UNIT(S): at 09:36

## 2021-06-17 RX ADMIN — Medication 2: at 09:35

## 2021-06-17 RX ADMIN — DULOXETINE HYDROCHLORIDE 60 MILLIGRAM(S): 30 CAPSULE, DELAYED RELEASE ORAL at 09:36

## 2021-06-17 RX ADMIN — Medication 1 GRAM(S): at 05:35

## 2021-06-17 RX ADMIN — Medication 10 MILLIGRAM(S): at 05:35

## 2021-06-17 RX ADMIN — Medication 500000 UNIT(S): at 05:35

## 2021-06-17 RX ADMIN — LOSARTAN POTASSIUM 100 MILLIGRAM(S): 100 TABLET, FILM COATED ORAL at 05:35

## 2021-06-17 RX ADMIN — Medication 25 MILLIGRAM(S): at 05:34

## 2021-06-17 RX ADMIN — PANTOPRAZOLE SODIUM 40 MILLIGRAM(S): 20 TABLET, DELAYED RELEASE ORAL at 05:35

## 2021-06-17 RX ADMIN — FENTANYL CITRATE 1 PATCH: 50 INJECTION INTRAVENOUS at 09:31

## 2021-06-17 RX ADMIN — GABAPENTIN 600 MILLIGRAM(S): 400 CAPSULE ORAL at 05:35

## 2021-06-17 RX ADMIN — CARVEDILOL PHOSPHATE 25 MILLIGRAM(S): 80 CAPSULE, EXTENDED RELEASE ORAL at 05:35

## 2021-06-17 NOTE — DISCHARGE NOTE NURSING/CASE MANAGEMENT/SOCIAL WORK - NSSCNAMETXT_GEN_ALL_CORE
Pt must have a PCP Md in the community to get services/ Please f/u with  a md clinic to make appointment for a md then md can make a refferal for home services if needed- pt made aware

## 2021-06-17 NOTE — DISCHARGE NOTE NURSING/CASE MANAGEMENT/SOCIAL WORK - NSDCDMENAME_GEN_ALL_CORE_FT
Pt insurance pending auth for only [employer] will be delivered to home once auth is approved/ Shower chair not coverd by nsurance must pvt pay at any surgical supply store of pharmacy

## 2021-06-17 NOTE — CHART NOTE - NSCHARTNOTEFT_GEN_A_CORE
Alerted by RN, Pt with /98mmhg, requested manual repeat 187/92mmhg. Pt seen and examined, c/o diffuse headache and chronic lower extremity pain and burning. Pt states the headache is the same type of headache she has regularly at home. She denies dizziness, change in vision, lightheadedness, numbness/tingling, chest pain, SOB, palpitations, abd pain, n/v.     VS: T(F): 98.1 (13 Jun 2021 11:06), HR: 82 (77 - 86) BP: 151/37124/93 - 200/98) RR: 18  (16 - 18) SpO2: 94% (94% - 100%)  A+O x 3, NAD, eating dinner with family at bedside  RRR +S1S2  CTA b/l no w/r/r  LE: +b/l LE tenderness to touch, b/l LE edema 1+  Neuro: Speech clear, language fluent, face symmetric, no dysmetria, sensation intact, MS intact 4/5+ b/l UE and LE.                         10.8   6.01  )-----------( 212      ( 13 Jun 2021 07:24 )             33.7   06-13    141  |  103  |  6<L>  ----------------------------<  217<H>  3.3<L>   |  24  |  1.07    Ca    9.6      13 Jun 2021 07:24  Phos  4.3     06-13  Mg     1.8     06-13    TPro  7.5  /  Alb  3.7  /  TBili  <0.2  /  DBili  x   /  AST  13  /  ALT  10  /  AlkPhos  119  06-11    55 yo F PMHx HTN, HLD, DM II on insulin, diabetic neuropathy, fibromyalgia, and chronic back pain now with b/l LE edema, now with hypertension and headache.  1) Hypertension: Pt given Lasix and Coreg, will reassess, if still elevated will consider starting hydralazine. Repeat BP in 30 min.   2) HA: Neuro exam is non-focal. Per Pt this is a normal headache she has often at home. Tylenol given, will reassess pain level and response to BP medication.   3) Diabetic neuropathy: D/w Dr. Goldberg earlier today, increased dose of Neurontin, first dose just given, c/w other pain medications as ordered. Will continue to monitor f/u pain reassessment.     Addendum: **Repeat BP following Coreg and Lasix, 151/93mmmhg. Will monitor and reassess.
As per attending note from 6/13/21- start Hydralazine if needed for BP control. /85 - Will start Hydralazine 10 mg TID.
Patient examined at bedside. Medically ready for dc. Did not have a ride until 10am today. 35 minutes spent discharge planning.
Patient Name: KENDELL Hernandes Date: 1965  Address: [address] [place], [place] [address]Uzw: Female  Rx Written	Rx Dispensed	Drug	Strength	Quantity	Days Supply  05/07/2021	05/07/2021	ALPRAZOLAM 2 MG TABLET		60.0	30  Prescriber Name DO GEIGER DANIEL  Payment Method Insurance  Dispenser [facility]  05/07/2021	05/07/2021	HYDROCODONE-ACETAMIN  MG		120.0	30  Prescriber Name DO GEIGER DANIEL  Payment Method Insurance  Dispenser [facility]  05/07/2021	05/07/2021	FENTANYL 75 MCG/HR PATCH		10.0	30  Prescriber Name DO GEIGER DANIEL  Payment Method Insurance  Dispenser [facility]

## 2024-04-01 NOTE — PHYSICAL THERAPY INITIAL EVALUATION ADULT - ASSISTIVE DEVICE FOR TRANSFER: GAIT, REHAB EVAL
Patient Instructions Post Procedure      The anesthetics, sedatives or narcotics which were given to you today will be acting in your body for the next 24 hours, so you might feel a little sleepy or groggy.  This feeling should slowly wear off. Carefully read and follow the instructions.     You received sedation today:  - Do not drive or operate any machinery or power tools of any kind.   - No alcoholic beverages today, not even beer or wine.  - Do not make any important decisions or sign any legal documents.  - No over the counter medications that contain alcohol or that may cause drowsiness.    While it is common to experience mild to moderate abdominal distention, gas, or belching after your procedure, if any of these symptoms occur following discharge from the GI Lab or within one week of having your procedure, call the [facility] to be advised whether a visit to your nearest Urgent Care or Emergency Department is indicated.  Take this paper with you if you go.   - If you develop an allergic reaction to the medications that were given during your procedure such as difficulty breathing, rash, hives, severe nausea, vomiting or lightheadedness.  - If you experience chest pain, shortness of breath, severe abdominal pain, fevers and chills.  -If you develop signs and symptoms of bleeding such as blood in your spit, if your stools turn black, tarry, or bloody  - If you have not urinated within 8 hours following your procedure.  - If your IV site becomes painful, red, inflamed, or looks infected.    If you received a biopsy/polypectomy/sphincterotomy the following instructions apply below:  __ Do not use Aspirin containing products, non-steroidal medications or anti-coagulants for one week following your procedure. (Examples of these types of medications are: Advil, Arthrotec, Aleve, Coumadin, Ecotrin, Heparin, Ibuprofen, Indocin, Motrin, Naprosyn, Nuprin, Plavix, Vioxx, and Voltarin, or their generic  forms.  This list is not all-inclusive.  Check with your physician or pharmacist before resuming medications.)   __ Eat a soft diet today.  Avoid foods that are poorly digested for the next 24 hours.  These foods would include: nuts, beans, lettuce, red meats, and fried foods. Start with liquids and advance your diet as tolerated, gradually work up to eating solids.   __ Do not have a Barium Study or Enema for one week.    Your physician recommends the additional following instructions:    -You have a contact number available for emergencies. The signs and symptoms of potential delayed complications were discussed with you. You may return to normal activities tomorrow.  -Resume your previous diet or other if specified.  -Continue your present medications.   -We are waiting for your pathology results, if applicable.  -The findings and recommendations have been discussed with you and/or family.  - Please see Medication Reconciliation Form for new medication/medications prescribed.     If you experience any problems or have any questions following discharge from the GI Lab, please call: [phone] from 7 am- 4:30 pm.  In the event of an emergency please go to the closest Emergency Department or call Dr. Kimball [phone].       rolling walker

## 2025-04-18 NOTE — PHYSICAL THERAPY INITIAL EVALUATION ADULT - IMPAIRMENTS FOUND, PT EVAL
Call to patient regarding renal US results. Reviewed recommend repeat in 6 months with UA. Micro UA +0-2 RBC and no presenting symptoms    Patient verbalized understanding and agreed with plan   gait, locomotion, and balance